# Patient Record
Sex: MALE | Race: BLACK OR AFRICAN AMERICAN | NOT HISPANIC OR LATINO | URBAN - METROPOLITAN AREA
[De-identification: names, ages, dates, MRNs, and addresses within clinical notes are randomized per-mention and may not be internally consistent; named-entity substitution may affect disease eponyms.]

---

## 2018-02-11 ENCOUNTER — INPATIENT (INPATIENT)
Facility: HOSPITAL | Age: 53
LOS: 1 days | Discharge: ANOTHER HOSPITAL | DRG: 309 | End: 2018-02-13
Attending: STUDENT IN AN ORGANIZED HEALTH CARE EDUCATION/TRAINING PROGRAM | Admitting: STUDENT IN AN ORGANIZED HEALTH CARE EDUCATION/TRAINING PROGRAM
Payer: COMMERCIAL

## 2018-02-11 VITALS
RESPIRATION RATE: 18 BRPM | TEMPERATURE: 98 F | WEIGHT: 304.9 LBS | DIASTOLIC BLOOD PRESSURE: 98 MMHG | HEIGHT: 67 IN | HEART RATE: 100 BPM | SYSTOLIC BLOOD PRESSURE: 150 MMHG | OXYGEN SATURATION: 100 %

## 2018-02-11 DIAGNOSIS — Z29.9 ENCOUNTER FOR PROPHYLACTIC MEASURES, UNSPECIFIED: ICD-10-CM

## 2018-02-11 DIAGNOSIS — R74.0 NONSPECIFIC ELEVATION OF LEVELS OF TRANSAMINASE AND LACTIC ACID DEHYDROGENASE [LDH]: ICD-10-CM

## 2018-02-11 DIAGNOSIS — R00.2 PALPITATIONS: ICD-10-CM

## 2018-02-11 DIAGNOSIS — E11.21 TYPE 2 DIABETES MELLITUS WITH DIABETIC NEPHROPATHY: ICD-10-CM

## 2018-02-11 DIAGNOSIS — I10 ESSENTIAL (PRIMARY) HYPERTENSION: ICD-10-CM

## 2018-02-11 DIAGNOSIS — I47.1 SUPRAVENTRICULAR TACHYCARDIA: ICD-10-CM

## 2018-02-11 DIAGNOSIS — N17.9 ACUTE KIDNEY FAILURE, UNSPECIFIED: ICD-10-CM

## 2018-02-11 LAB
ALBUMIN SERPL ELPH-MCNC: 3.2 G/DL — LOW (ref 3.5–5)
ALP SERPL-CCNC: 123 U/L — HIGH (ref 40–120)
ALT FLD-CCNC: 334 U/L DA — HIGH (ref 10–60)
ANION GAP SERPL CALC-SCNC: 10 MMOL/L — SIGNIFICANT CHANGE UP (ref 5–17)
ANION GAP SERPL CALC-SCNC: 8 MMOL/L — SIGNIFICANT CHANGE UP (ref 5–17)
AST SERPL-CCNC: 301 U/L — HIGH (ref 10–40)
BASOPHILS # BLD AUTO: 0.1 K/UL — SIGNIFICANT CHANGE UP (ref 0–0.2)
BASOPHILS NFR BLD AUTO: 1.5 % — SIGNIFICANT CHANGE UP (ref 0–2)
BILIRUB SERPL-MCNC: 0.3 MG/DL — SIGNIFICANT CHANGE UP (ref 0.2–1.2)
BUN SERPL-MCNC: 18 MG/DL — SIGNIFICANT CHANGE UP (ref 7–18)
BUN SERPL-MCNC: 21 MG/DL — HIGH (ref 7–18)
CALCIUM SERPL-MCNC: 8.7 MG/DL — SIGNIFICANT CHANGE UP (ref 8.4–10.5)
CALCIUM SERPL-MCNC: 8.8 MG/DL — SIGNIFICANT CHANGE UP (ref 8.4–10.5)
CHLORIDE SERPL-SCNC: 107 MMOL/L — SIGNIFICANT CHANGE UP (ref 96–108)
CHLORIDE SERPL-SCNC: 107 MMOL/L — SIGNIFICANT CHANGE UP (ref 96–108)
CHLORIDE UR-SCNC: 51 MMOL/L — LOW (ref 55–125)
CK MB BLD-MCNC: <0.9 % — SIGNIFICANT CHANGE UP (ref 0–3.5)
CK MB CFR SERPL CALC: <1 NG/ML — SIGNIFICANT CHANGE UP (ref 0–3.6)
CK SERPL-CCNC: 111 U/L — SIGNIFICANT CHANGE UP (ref 35–232)
CO2 SERPL-SCNC: 23 MMOL/L — SIGNIFICANT CHANGE UP (ref 22–31)
CO2 SERPL-SCNC: 26 MMOL/L — SIGNIFICANT CHANGE UP (ref 22–31)
CREAT SERPL-MCNC: 1.37 MG/DL — HIGH (ref 0.5–1.3)
CREAT SERPL-MCNC: 1.45 MG/DL — HIGH (ref 0.5–1.3)
EOSINOPHIL # BLD AUTO: 0.1 K/UL — SIGNIFICANT CHANGE UP (ref 0–0.5)
EOSINOPHIL NFR BLD AUTO: 2.6 % — SIGNIFICANT CHANGE UP (ref 0–6)
GLUCOSE SERPL-MCNC: 214 MG/DL — HIGH (ref 70–99)
GLUCOSE SERPL-MCNC: 250 MG/DL — HIGH (ref 70–99)
HBA1C BLD-MCNC: 10.4 % — HIGH (ref 4–5.6)
HCT VFR BLD CALC: 44.2 % — SIGNIFICANT CHANGE UP (ref 39–50)
HGB BLD-MCNC: 14.2 G/DL — SIGNIFICANT CHANGE UP (ref 13–17)
LYMPHOCYTES # BLD AUTO: 2 K/UL — SIGNIFICANT CHANGE UP (ref 1–3.3)
LYMPHOCYTES # BLD AUTO: 38.3 % — SIGNIFICANT CHANGE UP (ref 13–44)
MCHC RBC-ENTMCNC: 30.7 PG — SIGNIFICANT CHANGE UP (ref 27–34)
MCHC RBC-ENTMCNC: 32.1 GM/DL — SIGNIFICANT CHANGE UP (ref 32–36)
MCV RBC AUTO: 95.7 FL — SIGNIFICANT CHANGE UP (ref 80–100)
MONOCYTES # BLD AUTO: 0.5 K/UL — SIGNIFICANT CHANGE UP (ref 0–0.9)
MONOCYTES NFR BLD AUTO: 9.1 % — SIGNIFICANT CHANGE UP (ref 2–14)
NEUTROPHILS # BLD AUTO: 2.6 K/UL — SIGNIFICANT CHANGE UP (ref 1.8–7.4)
NEUTROPHILS NFR BLD AUTO: 48.5 % — SIGNIFICANT CHANGE UP (ref 43–77)
OSMOLALITY UR: 853 MOS/KG — SIGNIFICANT CHANGE UP (ref 50–1200)
PCP SPEC-MCNC: SIGNIFICANT CHANGE UP
PLATELET # BLD AUTO: 241 K/UL — SIGNIFICANT CHANGE UP (ref 150–400)
POTASSIUM SERPL-MCNC: 3.7 MMOL/L — SIGNIFICANT CHANGE UP (ref 3.5–5.3)
POTASSIUM SERPL-MCNC: 4 MMOL/L — SIGNIFICANT CHANGE UP (ref 3.5–5.3)
POTASSIUM SERPL-SCNC: 3.7 MMOL/L — SIGNIFICANT CHANGE UP (ref 3.5–5.3)
POTASSIUM SERPL-SCNC: 4 MMOL/L — SIGNIFICANT CHANGE UP (ref 3.5–5.3)
POTASSIUM UR-SCNC: 60 MMOL/L — SIGNIFICANT CHANGE UP (ref 25–125)
PROT ?TM UR-MCNC: 135 MG/DL — HIGH (ref 0–12)
PROT SERPL-MCNC: 6.9 G/DL — SIGNIFICANT CHANGE UP (ref 6–8.3)
RBC # BLD: 4.62 M/UL — SIGNIFICANT CHANGE UP (ref 4.2–5.8)
RBC # FLD: 12.2 % — SIGNIFICANT CHANGE UP (ref 10.3–14.5)
SODIUM SERPL-SCNC: 140 MMOL/L — SIGNIFICANT CHANGE UP (ref 135–145)
SODIUM SERPL-SCNC: 141 MMOL/L — SIGNIFICANT CHANGE UP (ref 135–145)
SODIUM UR-SCNC: 54 MMOL/L — SIGNIFICANT CHANGE UP (ref 40–220)
TROPONIN I SERPL-MCNC: 0.07 NG/ML — HIGH (ref 0–0.04)
TROPONIN I SERPL-MCNC: 0.09 NG/ML — HIGH (ref 0–0.04)
WBC # BLD: 5.3 K/UL — SIGNIFICANT CHANGE UP (ref 3.8–10.5)
WBC # FLD AUTO: 5.3 K/UL — SIGNIFICANT CHANGE UP (ref 3.8–10.5)

## 2018-02-11 PROCEDURE — 99285 EMERGENCY DEPT VISIT HI MDM: CPT | Mod: 25

## 2018-02-11 PROCEDURE — 99223 1ST HOSP IP/OBS HIGH 75: CPT | Mod: GC

## 2018-02-11 RX ORDER — SODIUM CHLORIDE 9 MG/ML
1000 INJECTION, SOLUTION INTRAVENOUS
Qty: 0 | Refills: 0 | Status: DISCONTINUED | OUTPATIENT
Start: 2018-02-11 | End: 2018-02-12

## 2018-02-11 RX ORDER — ASPIRIN/CALCIUM CARB/MAGNESIUM 324 MG
81 TABLET ORAL DAILY
Qty: 0 | Refills: 0 | Status: DISCONTINUED | OUTPATIENT
Start: 2018-02-11 | End: 2018-02-13

## 2018-02-11 RX ORDER — METOPROLOL TARTRATE 50 MG
25 TABLET ORAL
Qty: 0 | Refills: 0 | Status: DISCONTINUED | OUTPATIENT
Start: 2018-02-11 | End: 2018-02-11

## 2018-02-11 RX ORDER — ATORVASTATIN CALCIUM 80 MG/1
40 TABLET, FILM COATED ORAL AT BEDTIME
Qty: 0 | Refills: 0 | Status: DISCONTINUED | OUTPATIENT
Start: 2018-02-11 | End: 2018-02-11

## 2018-02-11 RX ORDER — LABETALOL HCL 100 MG
100 TABLET ORAL
Qty: 0 | Refills: 0 | Status: DISCONTINUED | OUTPATIENT
Start: 2018-02-11 | End: 2018-02-12

## 2018-02-11 RX ORDER — LISINOPRIL 2.5 MG/1
2.5 TABLET ORAL DAILY
Qty: 0 | Refills: 0 | Status: DISCONTINUED | OUTPATIENT
Start: 2018-02-11 | End: 2018-02-11

## 2018-02-11 RX ORDER — INSULIN GLARGINE 100 [IU]/ML
50 INJECTION, SOLUTION SUBCUTANEOUS AT BEDTIME
Qty: 0 | Refills: 0 | Status: DISCONTINUED | OUTPATIENT
Start: 2018-02-11 | End: 2018-02-13

## 2018-02-11 RX ORDER — INSULIN LISPRO 100/ML
20 VIAL (ML) SUBCUTANEOUS
Qty: 0 | Refills: 0 | Status: DISCONTINUED | OUTPATIENT
Start: 2018-02-11 | End: 2018-02-13

## 2018-02-11 RX ORDER — SODIUM CHLORIDE 9 MG/ML
1000 INJECTION INTRAMUSCULAR; INTRAVENOUS; SUBCUTANEOUS ONCE
Qty: 0 | Refills: 0 | Status: COMPLETED | OUTPATIENT
Start: 2018-02-11 | End: 2018-02-11

## 2018-02-11 RX ADMIN — Medication 20 UNIT(S): at 19:49

## 2018-02-11 RX ADMIN — Medication 81 MILLIGRAM(S): at 15:22

## 2018-02-11 RX ADMIN — Medication 20 UNIT(S): at 12:48

## 2018-02-11 RX ADMIN — SODIUM CHLORIDE 1000 MILLILITER(S): 9 INJECTION INTRAMUSCULAR; INTRAVENOUS; SUBCUTANEOUS at 07:45

## 2018-02-11 NOTE — H&P ADULT - HISTORY OF PRESENT ILLNESS
52 M with PMH of DM-2, Htn and Hld presented with palpitations since last 2 days. Patient states that he started to have palpitations, on and off more at night time, associated with shortness of breath. This morning, he started ot have similar symptoms and called EMS, found to be in SVT as per ED physician, s/p adenosine. Patient is late evening shift worker, admitts to drinks 2 cups of caffeine and 2 energy drinks everyday. Denies chest pain, nausea, vomiting, diarrhea, constipation, abdominal pain and pedal edema.   SH: Denies smoking. Drink socially. Former drug use, last use was 20 years ago.     ED Course: EKG: Sinus tachycardia at 103 bpm. At bedside, Patient was comfortably sitting, without any respiratory distress, HR running between 's.

## 2018-02-11 NOTE — H&P ADULT - PROBLEM SELECTOR PLAN 5
Patient found to have transaminitis with normal bilirubin, and benign abdomen.   Could be PELLETIER as patient is obese and not heavy alcoholic.   Follow [facility] abdomen. Follow Hepatitis panel. Trend liver enzymes. Patient found to have transaminitis with normal bilirubin, and benign abdomen.   Could be PELLETIER as patient is obese and not heavy alcoholic.   Bilirubin is normal, biliary obstruction less likely.  Follow [facility] abdomen. Follow Hepatitis panel. Trend liver enzymes. Patient found to have transaminitis with normal bilirubin, and benign abdomen.   Could be PELLETIER as patient is obese and not heavy alcoholic.   Bilirubin is normal, biliary obstruction less likely. Holding Statin.  Follow [facility] abdomen. Follow Hepatitis panel. Trend liver enzymes.

## 2018-02-11 NOTE — H&P ADULT - PROBLEM SELECTOR PLAN 6
IMPROVE VTE score: 0, no indication for DVt ppsx  [ ] Previous VTE                                                3  [ ] Thrombophilia                                             2  [ ] Lower limb paralysis                                  2        (unable to hold up >15 seconds)    [ ] Current Cancer (within 6 months)            2   [] Immobilization > 24 hrs                              1  [ ] ICU/CCU stay > 24 hours                            1  [] Age > 60                                                         1 IMPROVE VTE score: 0, no indication for DVt ppx  [ ] Previous VTE                                                3  [ ] Thrombophilia                                             2  [ ] Lower limb paralysis                                  2        (unable to hold up >15 seconds)    [ ] Current Cancer (within 6 months)            2   [] Immobilization > 24 hrs                              1  [ ] ICU/CCU stay > 24 hours                            1  [] Age > 60                                                         1

## 2018-02-11 NOTE — H&P ADULT - PROBLEM SELECTOR PLAN 4
IMPROVE VTE score: 0, no indication for DVt ppsx  [ ] Previous VTE                                                3  [ ] Thrombophilia                                             2  [ ] Lower limb paralysis                                  2        (unable to hold up >15 seconds)    [ ] Current Cancer (within 6 months)            2   [] Immobilization > 24 hrs                              1  [ ] ICU/CCU stay > 24 hours                            1  [] Age > 60                                                         1 Patient has elevated Cr of 1.37, unknown baseline s/p 1L NS bolus.   Follow BMP in AM  Follow urine lytes Patient has elevated Cr of 1.37, unknown baseline s/p 1L NS bolus.   Follow BMP in AM. Follow urine lytes. Patient has elevated Cr of 1.37, unknown baseline.   Fena 0.3 suggest pre renal. S/p 1L NS bolus.   Continue 1/2 NS since patient is hypertensive. Follow BMP in AM. Patient has elevated Cr of 1.37, unknown baseline.   Fena 0.3 suggest pre renal. S/p 1L NS bolus.   Continue 1/2 NS since patient is hypertensive. Follow BMP in AM.  **Holding ACE given worsening PATY.

## 2018-02-11 NOTE — H&P ADULT - PROBLEM SELECTOR PLAN 1
Patient presented with palpitations found to have SVT in field by EMS s/p adenosine.  Admit to telemetry to rule out ACS.   T1 0.074, follow serial troponin.  EKG Sinus tachycardia at 103bpm, Qtc 482 bpm.  Follow Echo heart and cardiology consult. Patient presented with palpitations found to have SVT in field by EMS s/p adenosine.  Drinking large amount of caffeine and energy drinks could be a cause.   Admit to telemetry to rule out ACS.   T1 0.074, follow serial troponin.  EKG Sinus tachycardia at 103bpm, Qtc 482.  Started metoprolol 25 BID. Started aspirin and atorvastatin.  Follow Echo heart and cardiology consult. Patient presented with palpitations found to have SVT in field by EMS s/p adenosine.  Drinking large amount of caffeine and energy drinks could be a cause.   Admit to telemetry to rule out ACS.   T1 0.074, follow serial troponin.  EKG Sinus tachycardia at 103bpm, Qtc 482.  Started metoprolol 25 BID. Started aspirin. Holding Statin given transaminitis.   Follow Echo heart and cardiology consult.

## 2018-02-11 NOTE — ED ADULT NURSE NOTE - OBJECTIVE STATEMENT
Pt presented to ED via Ambulance, c/o rapid heart beat, as per EMS he was having SVT's , converted with Adenosine,   Pt stated drinks lots of coffee and energy dinks, denies chest pain

## 2018-02-11 NOTE — H&P ADULT - NSHPLABSRESULTS_GEN_ALL_CORE
Vital Signs Last 24 Hrs  T(C): 36.6 (11 Feb 2018 07:05), Max: 36.6 (11 Feb 2018 07:05)  T(F): 97.8 (11 Feb 2018 07:05), Max: 97.8 (11 Feb 2018 07:05)  HR: 100 (11 Feb 2018 07:05) (100 - 100)  BP: 150/98 (11 Feb 2018 07:05) (150/98 - 150/98)  BP(mean): --  RR: 18 (11 Feb 2018 07:05) (18 - 18)  SpO2: 100% (11 Feb 2018 07:05) (100% - 100%)

## 2018-02-11 NOTE — H&P ADULT - NEUROLOGICAL DETAILS
responds to pain/responds to verbal commands/sensation intact/deep reflexes intact/alert and oriented x 3/normal strength

## 2018-02-11 NOTE — CONSULT NOTE ADULT - ASSESSMENT
52 yr old  Male with PMH of DM-2, Htn and Hld presented with palpitations since last 2 days. Patient states that he started to have palpitations, on and off more at night time, associated with shortness of breath., s/p adenosine for SVT.  1.Tele monitoring.  2.CE q8hx3.  3.Echocardiogram.  4.Stress test in AM.  5.HTN-Labetalol 100mg bid and low dose ace.  6.R/O CESAR-sleep study as outpatient.  7.Elevated LFT's-?fatty liver.  8.Check AM A1c,Lipid profile.  9.DM-Insulin.  10.GI and DVT prophylaxis.

## 2018-02-11 NOTE — H&P ADULT - PROBLEM SELECTOR PLAN 2
Patient is on Basglar 76 U at bedtime and Humalog 26 U TID before meals  Will continue Lantus 50U and Humalog 20 U (dose decreased as patient might have less PO intake while inpatient)  Follow HbA1c. Diabetic diet. Exercise 3-5 times a day as tolerated. Patient is on Basaglar 76 U at bedtime and Humalog 26 U TID before meals  Will continue Lantus 50U and Humalog 20 U (dose decreased as patient might have less PO intake while inpatient). Started Atorvastatin.   Follow HbA1c. Diabetic diet. Exercise 3-5 times a day as tolerated. Patient is on Basaglar 76 U at bedtime and Humalog 26 U TID before meals  Will continue Lantus 50U and Humalog 20 U (dose decreased as patient might have less PO intake while inpatient).   Follow HbA1c. Diabetic diet. Exercise 3-5 times a day as tolerated.

## 2018-02-11 NOTE — ED PROVIDER NOTE - OBJECTIVE STATEMENT
morbidly obese  male comes in c/o rapid heartbeat x 4 days worse at night   pt with h/o HTN , DM and hypercholesteremia   + use of energy drinks and coffee  no chest pain , feel like his stomach doesn't let him breath   opt was found by ems to have 's treated with adenocard

## 2018-02-11 NOTE — CONSULT NOTE ADULT - SUBJECTIVE AND OBJECTIVE BOX
CHIEF COMPLAINT:Patient is a 52y old  Male who presents with a chief complaint of Palpitations x 2 days (11 Feb 2018 10:30)    HPI:  52 yr old  Male with PMH of DM-2, Htn and Hld presented with palpitations since last 2 days. Patient states that he started to have palpitations, on and off more at night time, associated with shortness of breath. This morning, he started ot have similar symptoms and called EMS, found to be in SVT as per ED physician, s/p adenosine. 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 Denies chest pain, nausea, vomiting, diarrhea, constipation, abdominal pain and pedal edema.   SED Course: EKG: Sinus tachycardia at 103 bpm. At bedside, Patient was comfortably sitting, without any respiratory distress, HR running between 's. (11 Feb 2018 10:30)      PAST MEDICAL & SURGICAL HISTORY:  DM  HTN  Lipid D/O   Obesity      MEDICATIONS  (STANDING):  insulin glargine Injectable (LANTUS) 50 Unit(s) SubCutaneous at bedtime  insulin lispro Injectable (HumaLOG) 20 Unit(s) SubCutaneous three times a day before meals  metoprolol     tartrate 25 milliGRAM(s) Oral two times a day    MEDICATIONS  (PRN):      FAMILY HISTORY:Father-Gastric Ca,HTN. Mother-HTN, Brother-DM.      SOCIAL HISTORY:    [X ] Non-smoker    [X ] Alcohol-denies    Allergies    Allergy Status Unknown    Intolerances    	    REVIEW OF SYSTEMS:  CONSTITUTIONAL: No fever, weight loss, or fatigue  EYES: No eye pain, visual disturbances, or discharge  ENT:  No difficulty hearing, tinnitus, vertigo; No sinus or throat pain  NECK: No pain or stiffness  RESPIRATORY: No cough, wheezing, chills or hemoptysis; No Shortness of Breath  CARDIOVASCULAR: No chest pain,+ palpitations  GASTROINTESTINAL: No abdominal or epigastric pain. No nausea, vomiting, or hematemesis; No diarrhea or constipation. No melena or hematochezia.  GENITOURINARY: No dysuria, frequency, hematuria, or incontinence  NEUROLOGICAL: No headaches, memory loss, loss of strength, numbness, or tremors  SKIN: No itching, burning, rashes, or lesions   LYMPH Nodes: No enlarged glands  ENDOCRINE: No heat or cold intolerance; No hair loss  MUSCULOSKELETAL: No joint pain or swelling; No muscle, back, or extremity pain  PSYCHIATRIC: No depression, anxiety, mood swings, or difficulty sleeping  HEME/LYMPH: No easy bruising, or bleeding gums  ALLERGY AND IMMUNOLOGIC: No hives or eczema	      PHYSICAL EXAM:  T(C): 36.6 (02-11-18 @ 07:05), Max: 36.6 (02-11-18 @ 07:05)  HR: 100 (02-11-18 @ 07:05) (100 - 100)  BP: 150/98 (02-11-18 @ 07:05) (150/98 - 150/98)  RR: 18 (02-11-18 @ 07:05) (18 - 18)  SpO2: 100% (02-11-18 @ 07:05) (100% - 100%)  Wt(kg): --  I&O's Summary      Appearance: Normal	  HEENT:   Normal oral mucosa, PERRL, EOMI	  Lymphatic: No lymphadenopathy  Cardiovascular: Normal S1 S2, No JVD, No murmurs, No edema  Respiratory: Lungs clear to auscultation	  Psychiatry: A & O x 3, Mood & affect appropriate  Gastrointestinal:  Soft, Non-tender, + BS	  Skin: No rashes, No ecchymoses, No cyanosis	  Neurologic: Non-focal  Extremities: Normal range of motion, No clubbing, cyanosis or edema  Vascular: Peripheral pulses palpable 2+ bilaterally    TELEMETRY: 	sinus tach      	  LABS:	 	    CARDIAC MARKERS:  CARDIAC MARKERS ( 11 Feb 2018 08:07 )  0.074 ng/mL / x     / x     / x     / x                                  14.2   5.3   )-----------( 241      ( 11 Feb 2018 08:07 )             44.2     02-11    140  |  107  |  21<H>  ----------------------------<  250<H>  3.7   |  23  |  1.37<H>    Ca    8.8      11 Feb 2018 08:07    TPro  6.9  /  Alb  3.2<L>  /  TBili  0.3  /  DBili  x   /  AST  301<H>  /  ALT  334<H>  /  AlkPhos  123<H>  02-11    TSH: Thyroid Stimulating Hormone, Serum: 1.35 uU/mL (02-11 @ 11:50)      Urine tox-.

## 2018-02-11 NOTE — H&P ADULT - ASSESSMENT
52 M with PMH of DM-2, Htn and Hld presented with palpitations since last 2 days, found to be in SVT by EMS, s/p adenosine, found ot have elevated troponin on admission; admitted to telemetry.

## 2018-02-11 NOTE — H&P ADULT - PROBLEM SELECTOR PLAN 3
Patient found to have transaminitis with normal bilirubin, and benign abdomen.   Could be PELLETIER as patient is obese and not heavy alcoholic.   Follow [facility] abdomen. Follow Hepatitis panel. Trend liver enzymes. Patient was on multiple medication at home but is not taking from last 5 months.  Started lopressor for now. Titrate up the regimen.  goal bp<130/80 in diabetic patient

## 2018-02-11 NOTE — ED ADULT NURSE NOTE - CHPI ED SYMPTOMS NEG
no chills/no chest pain/no vomiting/no fever/no nausea/no syncope/no back pain/no cough/no shortness of breath

## 2018-02-11 NOTE — PATIENT PROFILE ADULT. - NS TRANSFER PATIENT BELONGINGS
Jewelry/Money (specify)/wireless earphones, Silver looking ring, insulin pens/Cell Phone/PDA (specify)

## 2018-02-11 NOTE — H&P ADULT - ATTENDING COMMENTS
Agree with above.   Patient was seen and examined in ED. HE is a 51 yo morbidly obese male with PMHs of HTN, DM presented with two days of palpitations. FOund to be in SVT which broke after 6 mg of adenosine. He denies chest pain, dizziness, headache, n/v, fever. Reports recent worsening of SOB on exertion.  Nor on any BP medications for the past 5 month     ROS as above   PE:   General: MOrbidly obese, NAD   NEck: supple, no JVD   CArdio: regular rate and rhythm, no murmur   Pulm: clear breath sounds   GI/: obese abdomen, non tender   Extr: no edema, no rash   Neuro: AOx3, no focal weakness     Vital Signs Last 24 Hrs  T(C): 36.5 (11 Feb 2018 16:01), Max: 36.6 (11 Feb 2018 07:05)  T(F): 97.7 (11 Feb 2018 16:01), Max: 97.8 (11 Feb 2018 07:05)  HR: 95 (11 Feb 2018 16:01) (95 - 100)  BP: 166/93 (11 Feb 2018 16:01) (150/98 - 166/93)  BP(mean): --  RR: 18 (11 Feb 2018 16:01) (18 - 18)  SpO2: 96% (11 Feb 2018 16:01) (96% - 100%)    1. SVT -broke s/p adenosine   EKG: NSR   Telemetry monitoring   Trend CEx3  Per cardiology consult started Labetalol 100 mg  PO twice daily along with Aspirin and Atorvastatin 40 mg PO daily   TTE and stress test in AM      2. DM: f/u HbA1C  Restart home doses of Lantus 50  and Humalog 20 three times a day     3. HTN: BP borderline elevated: on Labetolol       the rest as above

## 2018-02-11 NOTE — ED ADULT TRIAGE NOTE - CHIEF COMPLAINT QUOTE
c/o fast heart beat and sob,per ems patient having svt,adenosine 6 mg iv given and converted to sinus,patient denies chest pain

## 2018-02-12 DIAGNOSIS — I42.9 CARDIOMYOPATHY, UNSPECIFIED: ICD-10-CM

## 2018-02-12 DIAGNOSIS — I49.9 CARDIAC ARRHYTHMIA, UNSPECIFIED: ICD-10-CM

## 2018-02-12 DIAGNOSIS — E66.01 MORBID (SEVERE) OBESITY DUE TO EXCESS CALORIES: ICD-10-CM

## 2018-02-12 LAB
ALBUMIN SERPL ELPH-MCNC: 3.7 G/DL — SIGNIFICANT CHANGE UP (ref 3.5–5)
ALP SERPL-CCNC: 94 U/L — SIGNIFICANT CHANGE UP (ref 40–120)
ALT FLD-CCNC: 321 U/L DA — HIGH (ref 10–60)
ANION GAP SERPL CALC-SCNC: 5 MMOL/L — SIGNIFICANT CHANGE UP (ref 5–17)
AST SERPL-CCNC: 163 U/L — HIGH (ref 10–40)
BASOPHILS # BLD AUTO: 0.1 K/UL — SIGNIFICANT CHANGE UP (ref 0–0.2)
BASOPHILS NFR BLD AUTO: 1.4 % — SIGNIFICANT CHANGE UP (ref 0–2)
BILIRUB SERPL-MCNC: 0.5 MG/DL — SIGNIFICANT CHANGE UP (ref 0.2–1.2)
BUN SERPL-MCNC: 18 MG/DL — SIGNIFICANT CHANGE UP (ref 7–18)
CALCIUM SERPL-MCNC: 9.1 MG/DL — SIGNIFICANT CHANGE UP (ref 8.4–10.5)
CHLORIDE SERPL-SCNC: 108 MMOL/L — SIGNIFICANT CHANGE UP (ref 96–108)
CK MB BLD-MCNC: <1 % — SIGNIFICANT CHANGE UP (ref 0–3.5)
CK MB BLD-MCNC: <1 % — SIGNIFICANT CHANGE UP (ref 0–3.5)
CK MB CFR SERPL CALC: <1 NG/ML — SIGNIFICANT CHANGE UP (ref 0–3.6)
CK MB CFR SERPL CALC: <1 NG/ML — SIGNIFICANT CHANGE UP (ref 0–3.6)
CK SERPL-CCNC: 103 U/L — SIGNIFICANT CHANGE UP (ref 35–232)
CK SERPL-CCNC: 97 U/L — SIGNIFICANT CHANGE UP (ref 35–232)
CO2 SERPL-SCNC: 29 MMOL/L — SIGNIFICANT CHANGE UP (ref 22–31)
CREAT SERPL-MCNC: 1.29 MG/DL — SIGNIFICANT CHANGE UP (ref 0.5–1.3)
EOSINOPHIL # BLD AUTO: 0.3 K/UL — SIGNIFICANT CHANGE UP (ref 0–0.5)
EOSINOPHIL NFR BLD AUTO: 4.9 % — SIGNIFICANT CHANGE UP (ref 0–6)
GLUCOSE SERPL-MCNC: 141 MG/DL — HIGH (ref 70–99)
HAV IGM SER-ACNC: SIGNIFICANT CHANGE UP
HBV CORE IGM SER-ACNC: SIGNIFICANT CHANGE UP
HBV SURFACE AG SER-ACNC: SIGNIFICANT CHANGE UP
HCT VFR BLD CALC: 43.3 % — SIGNIFICANT CHANGE UP (ref 39–50)
HCV AB S/CO SERPL IA: 0.16 S/CO — SIGNIFICANT CHANGE UP
HCV AB SERPL-IMP: SIGNIFICANT CHANGE UP
HGB BLD-MCNC: 13.7 G/DL — SIGNIFICANT CHANGE UP (ref 13–17)
HIV 1+2 AB+HIV1 P24 AG SERPL QL IA: SIGNIFICANT CHANGE UP
LYMPHOCYTES # BLD AUTO: 2.4 K/UL — SIGNIFICANT CHANGE UP (ref 1–3.3)
LYMPHOCYTES # BLD AUTO: 35.3 % — SIGNIFICANT CHANGE UP (ref 13–44)
MAGNESIUM SERPL-MCNC: 2.1 MG/DL — SIGNIFICANT CHANGE UP (ref 1.6–2.6)
MCHC RBC-ENTMCNC: 30.7 PG — SIGNIFICANT CHANGE UP (ref 27–34)
MCHC RBC-ENTMCNC: 31.7 GM/DL — LOW (ref 32–36)
MCV RBC AUTO: 96.9 FL — SIGNIFICANT CHANGE UP (ref 80–100)
MONOCYTES # BLD AUTO: 0.4 K/UL — SIGNIFICANT CHANGE UP (ref 0–0.9)
MONOCYTES NFR BLD AUTO: 6.1 % — SIGNIFICANT CHANGE UP (ref 2–14)
NEUTROPHILS # BLD AUTO: 3.5 K/UL — SIGNIFICANT CHANGE UP (ref 1.8–7.4)
NEUTROPHILS NFR BLD AUTO: 52.3 % — SIGNIFICANT CHANGE UP (ref 43–77)
PHOSPHATE SERPL-MCNC: 4.4 MG/DL — SIGNIFICANT CHANGE UP (ref 2.5–4.5)
PLATELET # BLD AUTO: 228 K/UL — SIGNIFICANT CHANGE UP (ref 150–400)
POTASSIUM SERPL-MCNC: 4.5 MMOL/L — SIGNIFICANT CHANGE UP (ref 3.5–5.3)
POTASSIUM SERPL-SCNC: 4.5 MMOL/L — SIGNIFICANT CHANGE UP (ref 3.5–5.3)
PROT SERPL-MCNC: 7.2 G/DL — SIGNIFICANT CHANGE UP (ref 6–8.3)
RBC # BLD: 4.47 M/UL — SIGNIFICANT CHANGE UP (ref 4.2–5.8)
RBC # FLD: 12.5 % — SIGNIFICANT CHANGE UP (ref 10.3–14.5)
SODIUM SERPL-SCNC: 142 MMOL/L — SIGNIFICANT CHANGE UP (ref 135–145)
TROPONIN I SERPL-MCNC: 0.05 NG/ML — HIGH (ref 0–0.04)
TROPONIN I SERPL-MCNC: 0.07 NG/ML — HIGH (ref 0–0.04)
TSH SERPL-MCNC: 0.95 UU/ML — SIGNIFICANT CHANGE UP (ref 0.34–4.82)
WBC # BLD: 6.7 K/UL — SIGNIFICANT CHANGE UP (ref 3.8–10.5)
WBC # FLD AUTO: 6.7 K/UL — SIGNIFICANT CHANGE UP (ref 3.8–10.5)

## 2018-02-12 PROCEDURE — 99233 SBSQ HOSP IP/OBS HIGH 50: CPT | Mod: GC

## 2018-02-12 RX ORDER — LISINOPRIL 2.5 MG/1
5 TABLET ORAL DAILY
Qty: 0 | Refills: 0 | Status: DISCONTINUED | OUTPATIENT
Start: 2018-02-12 | End: 2018-02-12

## 2018-02-12 RX ORDER — LABETALOL HCL 100 MG
100 TABLET ORAL THREE TIMES A DAY
Qty: 0 | Refills: 0 | Status: DISCONTINUED | OUTPATIENT
Start: 2018-02-12 | End: 2018-02-13

## 2018-02-12 RX ORDER — ATORVASTATIN CALCIUM 80 MG/1
40 TABLET, FILM COATED ORAL AT BEDTIME
Qty: 0 | Refills: 0 | Status: DISCONTINUED | OUTPATIENT
Start: 2018-02-12 | End: 2018-02-13

## 2018-02-12 RX ORDER — LISINOPRIL 2.5 MG/1
10 TABLET ORAL DAILY
Qty: 0 | Refills: 0 | Status: DISCONTINUED | OUTPATIENT
Start: 2018-02-12 | End: 2018-02-13

## 2018-02-12 RX ORDER — ACETYLCYSTEINE 200 MG/ML
600 VIAL (ML) MISCELLANEOUS
Qty: 0 | Refills: 0 | Status: DISCONTINUED | OUTPATIENT
Start: 2018-02-12 | End: 2018-02-13

## 2018-02-12 RX ADMIN — INSULIN GLARGINE 50 UNIT(S): 100 INJECTION, SOLUTION SUBCUTANEOUS at 21:26

## 2018-02-12 RX ADMIN — Medication 600 MILLIGRAM(S): at 17:31

## 2018-02-12 RX ADMIN — Medication 100 MILLIGRAM(S): at 14:46

## 2018-02-12 RX ADMIN — INSULIN GLARGINE 50 UNIT(S): 100 INJECTION, SOLUTION SUBCUTANEOUS at 01:09

## 2018-02-12 RX ADMIN — Medication 100 MILLIGRAM(S): at 21:26

## 2018-02-12 RX ADMIN — Medication 100 MILLIGRAM(S): at 06:23

## 2018-02-12 RX ADMIN — LISINOPRIL 5 MILLIGRAM(S): 2.5 TABLET ORAL at 12:45

## 2018-02-12 RX ADMIN — Medication 100 MILLIGRAM(S): at 01:09

## 2018-02-12 RX ADMIN — ATORVASTATIN CALCIUM 40 MILLIGRAM(S): 80 TABLET, FILM COATED ORAL at 21:26

## 2018-02-12 RX ADMIN — Medication 81 MILLIGRAM(S): at 12:45

## 2018-02-12 RX ADMIN — Medication 20 UNIT(S): at 12:45

## 2018-02-12 NOTE — PROGRESS NOTE ADULT - PROBLEM SELECTOR PLAN 3
Stress test done; EF 30 %   Patient would need a cath to rule out ischemic cause   Plan for possible transfer in AM   On Lisinopril and Labetalol

## 2018-02-12 NOTE — DISCHARGE NOTE ADULT - PATIENT PORTAL LINK FT
You can access the [facility], offered by [facility], by registering with the following website: [email or web address]

## 2018-02-12 NOTE — DISCHARGE NOTE ADULT - HOSPITAL COURSE
52 M with PMH of DM-2, Htn and Hld presented with palpitations since last 2 days, found to be in SVT by EMS, s/p adenosine, found ot have elevated troponin on admission; admitted to telemetry. Pt had mildly elevated troponins to max 0.08, all 3 mildly elevated. EKG showed sinus tachycardiac at 103. Pt started on metoprolol 25 BID, started on asprin, holding statin given transaminitis.   Stress test showed reduced EF of 34%, no reversible ischemia. Per cardiology, patient should undergo cardiac catheterization given suspicion for acute ischemia.   Pt on Basaglar 76 U at bedtime and humalog 26 U TID before meals, however continued lantus 50 U and humalog 20 U TID due to less PO intake while in-house. HbA1c is 10.4%             Problem/Plan - 4:  ·  Problem: PATY (acute kidney injury).  Plan: Patient has elevated Cr of 1.37, unknown baseline.   Fena 0.3 suggest pre renal. S/p 1L NS bolus.   Continue 1/2 NS since patient is hypertensive. Follow BMP in AM.  **Holding ACE given worsening PATY.      Problem/Plan - 5:  ·  Problem: Transaminitis.  Plan: Patient found to have transaminitis with normal bilirubin, and benign abdomen.   Could be PELLETIER as patient is obese and not heavy alcoholic.   Bilirubin is normal, biliary obstruction less likely. Holding Statin.  Follow [facility] abdomen. Follow Hepatitis panel. Trend liver enzymes. 52 M with PMH of DM-2, Htn and Hld presented with palpitations since last 2 days, found to be in SVT by EMS, s/p adenosine, found ot have elevated troponin on admission; admitted to telemetry. Pt had mildly elevated troponins to max 0.08, all 3 mildly elevated. EKG showed sinus tachycardiac at 103. Pt started on metoprolol 25 BID, started on asprin, holding statin given transaminitis.   Stress test showed reduced EF of 34%, no reversible ischemia. Per cardiology, patient should undergo cardiac catheterization given suspicion for acute ischemia.   Pt on Basaglar 76 U at bedtime and humalog 26 U TID before meals, however continued lantus 50 U and humalog 20 U TID due to less PO intake while in-house. HbA1c is 10.4%.  Pt had elevated creatinine of 1.37 with unknown baseline.   ACE inhibitor held given worsening PATY and 1L bolus given with maintenance at 1/2 NS. Creatinine has improved to 1.25.  Pt found to have transaminitis with normal bilirubin, benign abdomen. Statin was therefore held. Could be PELLETIER as pt is obese and not a heavy alcoholic per history. Hepatitis panel was negative. Pt had US of abdomen which showed...    Pt is stable for transfer to [place] for cardiac catheterization due to high suspicion of ischemic cardiomyopathy. Discussed above with attending Dr. Ochoa and cardiologist Dr. Rivas.

## 2018-02-12 NOTE — DISCHARGE NOTE ADULT - CARE PLAN
Principal Discharge DX:	Cardiomyopathy  Secondary Diagnosis:	Hypertension  Secondary Diagnosis:	PATY (acute kidney injury)  Secondary Diagnosis:	Transaminitis  Secondary Diagnosis:	Type 2 diabetes mellitus with diabetic nephropathy, with long-term current use of insulin Principal Discharge DX:	Cardiomyopathy  Goal:	cardiac catheterization to determine ischemic origin  Assessment and plan of treatment:	Your stress test was found to be abnormal, with reduced EF of 34%, however no ischemia or reversible ischemia was noted on the stress test. To be absolutely sure of no ischemic causes of your cardiomyopathy, it is advised for you to undergo cardiac catheterization; therefore you will be transferred to [place].  Secondary Diagnosis:	Hypertension  Goal:	BP < 140/90  Assessment and plan of treatment:	Continue with lisinopril and labetalol as above.  Secondary Diagnosis:	PATY (acute kidney injury)  Secondary Diagnosis:	Transaminitis  Assessment and plan of treatment:	Your liver enzymes were initially very elevated, however are now trending down. You were checked for hepatitis, results of which are normal. Please follow-up with an abdominal ultrasound to further investigate this finding.  Secondary Diagnosis:	Type 2 diabetes mellitus with diabetic nephropathy, with long-term current use of insulin  Goal:	HbA1c <7.0%  Assessment and plan of treatment:	Continue with insulin regimen as above. Your HbA1c is 10.4%. Please recheck your HbA1c in 3-4 months. Principal Discharge DX:	Cardiomyopathy  Goal:	cardiac catheterization to determine ischemic origin  Assessment and plan of treatment:	Your stress test was found to be abnormal, with reduced EF of 34%, however no ischemia or reversible ischemia was noted on the stress test. To be absolutely sure of no ischemic causes of your cardiomyopathy, it is advised for you to undergo cardiac catheterization; therefore you will be transferred to [place].  Secondary Diagnosis:	Hypertension  Goal:	BP < 140/90  Assessment and plan of treatment:	Continue with lisinopril and labetalol as above.  Secondary Diagnosis:	PATY (acute kidney injury)  Assessment and plan of treatment:	Your creatinine was found to be elevated, signifying acute kidney injury. With IV fluids and acetylcysteine, your creatinine improved to 1.29. As your baseline is unknown, your creatinine may continue to improve or it may stay at 1.29 signifying possible chronic kidney disease, likely secondary to diabetes mellitus. Please follow-up with your primary care doctor regarding your creatinine levels. Lots of oral hydration is greatly encouraged.  Secondary Diagnosis:	Transaminitis  Assessment and plan of treatment:	Your liver enzymes were initially very elevated, however are now trending down. You were checked for hepatitis, results of which are normal. Please follow-up with an abdominal ultrasound to further investigate this finding.  Secondary Diagnosis:	Type 2 diabetes mellitus with diabetic nephropathy, with long-term current use of insulin  Goal:	HbA1c <7.0%  Assessment and plan of treatment:	Continue with insulin regimen as above. Your HbA1c is 10.4%. Please recheck your HbA1c in 3-4 months.

## 2018-02-12 NOTE — PROGRESS NOTE ADULT - ASSESSMENT
52 yr old  Male with PMH of DM-2, Htn and Hld presented with palpitations since last 2 days. Patient states that he started to have palpitations, on and off more at night time, associated with shortness of breath., s/p adenosine for SVT.  1.Tele monitoring.  2.CE q8hx3.  3.Echocardiogram.  4.Stress test today  5.HTN- inc Labetalol 100mg tid and low dose ace.  6.R/O CESAR-sleep study as outpatient.  7.Elevated LFT's-?fatty liver, abd sono ordered.  8.Check Lipid profile.  9.DM-Insulin.  10.GI and DVT prophylaxis.

## 2018-02-12 NOTE — DISCHARGE NOTE ADULT - MEDICATION SUMMARY - MEDICATIONS TO CHANGE
I will SWITCH the dose or number of times a day I take the medications listed below when I get home from the hospital:    Basaglar KwikPen 100 units/mL subcutaneous solution  -- 76 unit(s) subcutaneous once a day (at bedtime)    HumaLOG 100 units/mL injectable solution  -- 26 unit(s) injectable 3 times a day (before meals)

## 2018-02-12 NOTE — PROGRESS NOTE ADULT - SUBJECTIVE AND OBJECTIVE BOX
Patient is a 52y old  Male who presents with a chief complaint of Palpitations x 2 days (12 Feb 2018 15:11)      INTERVAL HPI/OVERNIGHT EVENTS: patient is seen and examine at bedside. No overnight event. Had stress test done earlier.     MEDICATIONS  (STANDING):  acetylcysteine  Oral Solution 600 milliGRAM(s) Oral two times a day  aspirin  chewable 81 milliGRAM(s) Oral daily  insulin glargine Injectable (LANTUS) 50 Unit(s) SubCutaneous at bedtime  insulin lispro Injectable (HumaLOG) 20 Unit(s) SubCutaneous three times a day before meals  labetalol 100 milliGRAM(s) Oral three times a day  lisinopril 5 milliGRAM(s) Oral daily    MEDICATIONS  (PRN):      Allergies    Allergy Status Unknown    Intolerances        REVIEW OF SYSTEMS:  CONSTITUTIONAL: No fever, weight loss, or fatigue  RESPIRATORY: No cough, wheezing, chills or hemoptysis; No shortness of breath  CARDIOVASCULAR: No chest pain, palpitations, dizziness, or leg swelling  GASTROINTESTINAL: No abdominal or epigastric pain. No nausea, vomiting, or hematemesis; No diarrhea or constipation. No melena or hematochezia.  NEUROLOGICAL: No headaches, memory loss, loss of strength, numbness, or tremors  MUSCULOSKELETAL: No joint pain or swelling; No muscle, back, or extremity pain      Vital Signs Last 24 Hrs  T(C): 36.7 (12 Feb 2018 11:38), Max: 37.3 (12 Feb 2018 07:23)  T(F): 98.1 (12 Feb 2018 11:38), Max: 99.2 (12 Feb 2018 07:23)  HR: 96 (12 Feb 2018 11:38) (94 - 96)  BP: 169/96 (12 Feb 2018 11:38) (151/91 - 171/104)  BP(mean): --  RR: 19 (12 Feb 2018 11:38) (18 - 19)  SpO2: 95% (12 Feb 2018 11:38) (94% - 98%)    PHYSICAL EXAM:  GENERAL: NAD, Morbidly obese   HEAD:  Atraumatic, Normocephalic  EYES:  conjunctiva and sclera clear  NECK: Supple, No JVD, Normal thyroid  NERVOUS SYSTEM:  Alert & Oriented X3, No gross focal deficits  CHEST/LUNG: Clear to percussion bilaterally; No rales, rhonchi, wheezing, or rubs  HEART: Regular rate and rhythm; No murmurs, rubs, or gallops  ABDOMEN: Obese abdomen, Soft, Nontender, Nondistended; Bowel sounds present  EXTREMITIES:  No clubbing, cyanosis, or edema  SKIN: No rashes or lesions    LABS:                        13.7   6.7   )-----------( 228      ( 12 Feb 2018 07:43 )             43.3     02-12    142  |  108  |  18  ----------------------------<  141<H>  4.5   |  29  |  1.29    Ca    9.1      12 Feb 2018 07:43  Phos  4.4     02-12  Mg     2.1     02-12    TPro  7.2  /  Alb  3.7  /  TBili  0.5  /  DBili  0.1  /  AST  163<H>  /  ALT  321<H>  /  AlkPhos  94  02-12        CAPILLARY BLOOD GLUCOSE      POCT Blood Glucose.: 183 mg/dL (12 Feb 2018 12:06)  POCT Blood Glucose.: 116 mg/dL (12 Feb 2018 00:54)  POCT Blood Glucose.: 188 mg/dL (11 Feb 2018 19:26)      RADIOLOGY & ADDITIONAL TESTS:    Imaging Personally Reviewed:  [ ] YES  [ ] NO    Consultant(s) Notes Reviewed:  [ x] YES  [ ] NO    Care Discussed with Consultants/Other Providers [ ] YES  [ ] NO    Plan of Care discussed with Housestaff [ ]YES [ ] NO

## 2018-02-12 NOTE — PROGRESS NOTE ADULT - PROBLEM SELECTOR PLAN 6
Patient found to have transaminitis with normal bilirubin, and benign abdomen.   Could be PELLETIER as patient is obese and not heavy alcoholic.   Bilirubin is normal, biliary obstruction less likely.   Pending US abdomen

## 2018-02-12 NOTE — DISCHARGE NOTE ADULT - MEDICATION SUMMARY - MEDICATIONS TO TAKE
I will START or STAY ON the medications listed below when I get home from the hospital:    aspirin 81 mg oral tablet, chewable  -- 1 tab(s) by mouth once a day  -- Indication: For Prophylactic measure    lisinopril 10 mg oral tablet  -- 1 tab(s) by mouth once a day  -- Indication: For Hypertension    insulin glargine 100 units/mL subcutaneous solution  -- 50 unit(s) subcutaneous once a day (at bedtime) x 30 days   -- Do not drink alcoholic beverages when taking this medication.  It is very important that you take or use this exactly as directed.  Do not skip doses or discontinue unless directed by your doctor.  Keep in refrigerator.  Do not freeze.    -- Indication: For Diabetes mellitus    HumaLOG KwikPen (Concentrated) 200 units/mL subcutaneous solution  -- 20 unit(s) subcutaneous 3 times a day   -- Indication: For Diabetes mellitus    acetylcysteine 600 mg oral capsule  -- 1 cap(s) by mouth 2 times a day   -- Take with food or milk.    -- Indication: For PATY (acute kidney injury)    atorvastatin 40 mg oral tablet  -- 1 tab(s) by mouth once a day (at bedtime)  -- Indication: For Hyperlipidemia    labetalol 100 mg oral tablet  -- 1 tab(s) by mouth 3 times a day  -- Indication: For Hypertension

## 2018-02-12 NOTE — DISCHARGE NOTE ADULT - PLAN OF CARE
cardiac catheterization to determine ischemic origin Your stress test was found to be abnormal, with reduced EF of 34%, however no ischemia or reversible ischemia was noted on the stress test. To be absolutely sure of no ischemic causes of your cardiomyopathy, it is advised for you to undergo cardiac catheterization; therefore you will be transferred to [place]. BP < 140/90 Continue with lisinopril and labetalol as above. Your liver enzymes were initially very elevated, however are now trending down. You were checked for hepatitis, results of which are normal. Please follow-up with an abdominal ultrasound to further investigate this finding. HbA1c <7.0% Continue with insulin regimen as above. Your HbA1c is 10.4%. Please recheck your HbA1c in 3-4 months. Your creatinine was found to be elevated, signifying acute kidney injury. With IV fluids and acetylcysteine, your creatinine improved to 1.29. As your baseline is unknown, your creatinine may continue to improve or it may stay at 1.29 signifying possible chronic kidney disease, likely secondary to diabetes mellitus. Please follow-up with your primary care doctor regarding your creatinine levels. Lots of oral hydration is greatly encouraged.

## 2018-02-12 NOTE — DISCHARGE NOTE ADULT - SECONDARY DIAGNOSIS.
Hypertension PATY (acute kidney injury) Transaminitis Type 2 diabetes mellitus with diabetic nephropathy, with long-term current use of insulin

## 2018-02-13 ENCOUNTER — INPATIENT (INPATIENT)
Facility: HOSPITAL | Age: 53
LOS: 0 days | Discharge: HOME | DRG: 287 | End: 2018-02-13
Attending: INTERNAL MEDICINE | Admitting: INTERNAL MEDICINE
Payer: COMMERCIAL

## 2018-02-13 VITALS
SYSTOLIC BLOOD PRESSURE: 145 MMHG | TEMPERATURE: 98 F | HEIGHT: 67 IN | HEART RATE: 85 BPM | OXYGEN SATURATION: 96 % | RESPIRATION RATE: 18 BRPM | WEIGHT: 304.02 LBS | DIASTOLIC BLOOD PRESSURE: 93 MMHG

## 2018-02-13 VITALS
TEMPERATURE: 99 F | DIASTOLIC BLOOD PRESSURE: 84 MMHG | OXYGEN SATURATION: 94 % | RESPIRATION RATE: 17 BRPM | SYSTOLIC BLOOD PRESSURE: 145 MMHG | HEART RATE: 85 BPM

## 2018-02-13 VITALS — WEIGHT: 303.8 LBS

## 2018-02-13 DIAGNOSIS — I42.9 CARDIOMYOPATHY, UNSPECIFIED: ICD-10-CM

## 2018-02-13 LAB
ALBUMIN SERPL ELPH-MCNC: 3.4 G/DL — LOW (ref 3.5–5)
ALP SERPL-CCNC: 85 U/L — SIGNIFICANT CHANGE UP (ref 40–120)
ALT FLD-CCNC: 205 U/L DA — HIGH (ref 10–60)
ANION GAP SERPL CALC-SCNC: 7 MMOL/L — SIGNIFICANT CHANGE UP (ref 5–17)
AST SERPL-CCNC: 46 U/L — HIGH (ref 10–40)
BASOPHILS # BLD AUTO: 0.1 K/UL — SIGNIFICANT CHANGE UP (ref 0–0.2)
BASOPHILS NFR BLD AUTO: 1 % — SIGNIFICANT CHANGE UP (ref 0–2)
BILIRUB SERPL-MCNC: 0.5 MG/DL — SIGNIFICANT CHANGE UP (ref 0.2–1.2)
BUN SERPL-MCNC: 15 MG/DL — SIGNIFICANT CHANGE UP (ref 7–18)
CALCIUM SERPL-MCNC: 8.8 MG/DL — SIGNIFICANT CHANGE UP (ref 8.4–10.5)
CHLORIDE SERPL-SCNC: 104 MMOL/L — SIGNIFICANT CHANGE UP (ref 96–108)
CO2 SERPL-SCNC: 29 MMOL/L — SIGNIFICANT CHANGE UP (ref 22–31)
CREAT SERPL-MCNC: 1.29 MG/DL — SIGNIFICANT CHANGE UP (ref 0.5–1.3)
EOSINOPHIL # BLD AUTO: 0.3 K/UL — SIGNIFICANT CHANGE UP (ref 0–0.5)
EOSINOPHIL NFR BLD AUTO: 4.4 % — SIGNIFICANT CHANGE UP (ref 0–6)
GLUCOSE BLDC GLUCOMTR-MCNC: 140 MG/DL — HIGH (ref 70–99)
GLUCOSE BLDC GLUCOMTR-MCNC: 141 MG/DL — HIGH (ref 70–99)
GLUCOSE SERPL-MCNC: 196 MG/DL — HIGH (ref 70–99)
HCT VFR BLD CALC: 45.2 % — SIGNIFICANT CHANGE UP (ref 39–50)
HGB BLD-MCNC: 13.8 G/DL — SIGNIFICANT CHANGE UP (ref 13–17)
LYMPHOCYTES # BLD AUTO: 2.7 K/UL — SIGNIFICANT CHANGE UP (ref 1–3.3)
LYMPHOCYTES # BLD AUTO: 37.2 % — SIGNIFICANT CHANGE UP (ref 13–44)
MAGNESIUM SERPL-MCNC: 2 MG/DL — SIGNIFICANT CHANGE UP (ref 1.6–2.6)
MCHC RBC-ENTMCNC: 29.2 PG — SIGNIFICANT CHANGE UP (ref 27–34)
MCHC RBC-ENTMCNC: 30.6 GM/DL — LOW (ref 32–36)
MCV RBC AUTO: 95.7 FL — SIGNIFICANT CHANGE UP (ref 80–100)
MONOCYTES # BLD AUTO: 0.5 K/UL — SIGNIFICANT CHANGE UP (ref 0–0.9)
MONOCYTES NFR BLD AUTO: 7.5 % — SIGNIFICANT CHANGE UP (ref 2–14)
NEUTROPHILS # BLD AUTO: 3.6 K/UL — SIGNIFICANT CHANGE UP (ref 1.8–7.4)
NEUTROPHILS NFR BLD AUTO: 49.8 % — SIGNIFICANT CHANGE UP (ref 43–77)
PHOSPHATE SERPL-MCNC: 3.5 MG/DL — SIGNIFICANT CHANGE UP (ref 2.5–4.5)
PLATELET # BLD AUTO: 232 K/UL — SIGNIFICANT CHANGE UP (ref 150–400)
POTASSIUM SERPL-MCNC: 4.2 MMOL/L — SIGNIFICANT CHANGE UP (ref 3.5–5.3)
POTASSIUM SERPL-SCNC: 4.2 MMOL/L — SIGNIFICANT CHANGE UP (ref 3.5–5.3)
PROT SERPL-MCNC: 7 G/DL — SIGNIFICANT CHANGE UP (ref 6–8.3)
RBC # BLD: 4.72 M/UL — SIGNIFICANT CHANGE UP (ref 4.2–5.8)
RBC # FLD: 12.4 % — SIGNIFICANT CHANGE UP (ref 10.3–14.5)
SODIUM SERPL-SCNC: 140 MMOL/L — SIGNIFICANT CHANGE UP (ref 135–145)
WBC # BLD: 7.2 K/UL — SIGNIFICANT CHANGE UP (ref 3.8–10.5)
WBC # FLD AUTO: 7.2 K/UL — SIGNIFICANT CHANGE UP (ref 3.8–10.5)

## 2018-02-13 PROCEDURE — 80074 ACUTE HEPATITIS PANEL: CPT

## 2018-02-13 PROCEDURE — C1769: CPT

## 2018-02-13 PROCEDURE — 80053 COMPREHEN METABOLIC PANEL: CPT

## 2018-02-13 PROCEDURE — 99152 MOD SED SAME PHYS/QHP 5/>YRS: CPT

## 2018-02-13 PROCEDURE — 85027 COMPLETE CBC AUTOMATED: CPT

## 2018-02-13 PROCEDURE — 82248 BILIRUBIN DIRECT: CPT

## 2018-02-13 PROCEDURE — 87389 HIV-1 AG W/HIV-1&-2 AB AG IA: CPT

## 2018-02-13 PROCEDURE — 83735 ASSAY OF MAGNESIUM: CPT

## 2018-02-13 PROCEDURE — 82553 CREATINE MB FRACTION: CPT

## 2018-02-13 PROCEDURE — C1894: CPT

## 2018-02-13 PROCEDURE — 84133 ASSAY OF URINE POTASSIUM: CPT

## 2018-02-13 PROCEDURE — 93017 CV STRESS TEST TRACING ONLY: CPT

## 2018-02-13 PROCEDURE — C1887: CPT

## 2018-02-13 PROCEDURE — 84443 ASSAY THYROID STIM HORMONE: CPT

## 2018-02-13 PROCEDURE — 80061 LIPID PANEL: CPT

## 2018-02-13 PROCEDURE — 93306 TTE W/DOPPLER COMPLETE: CPT

## 2018-02-13 PROCEDURE — 93460 R&L HRT ART/VENTRICLE ANGIO: CPT

## 2018-02-13 PROCEDURE — 82962 GLUCOSE BLOOD TEST: CPT

## 2018-02-13 PROCEDURE — A9502: CPT

## 2018-02-13 PROCEDURE — 84100 ASSAY OF PHOSPHORUS: CPT

## 2018-02-13 PROCEDURE — 80307 DRUG TEST PRSMV CHEM ANLYZR: CPT

## 2018-02-13 PROCEDURE — 93005 ELECTROCARDIOGRAM TRACING: CPT

## 2018-02-13 PROCEDURE — 83036 HEMOGLOBIN GLYCOSYLATED A1C: CPT

## 2018-02-13 PROCEDURE — 78452 HT MUSCLE IMAGE SPECT MULT: CPT

## 2018-02-13 PROCEDURE — 84300 ASSAY OF URINE SODIUM: CPT

## 2018-02-13 PROCEDURE — 82550 ASSAY OF CK (CPK): CPT

## 2018-02-13 PROCEDURE — 83935 ASSAY OF URINE OSMOLALITY: CPT

## 2018-02-13 PROCEDURE — 84484 ASSAY OF TROPONIN QUANT: CPT

## 2018-02-13 PROCEDURE — 99153 MOD SED SAME PHYS/QHP EA: CPT

## 2018-02-13 PROCEDURE — 93010 ELECTROCARDIOGRAM REPORT: CPT

## 2018-02-13 PROCEDURE — 82436 ASSAY OF URINE CHLORIDE: CPT

## 2018-02-13 PROCEDURE — 82570 ASSAY OF URINE CREATININE: CPT

## 2018-02-13 PROCEDURE — 99285 EMERGENCY DEPT VISIT HI MDM: CPT | Mod: 25

## 2018-02-13 PROCEDURE — 84156 ASSAY OF PROTEIN URINE: CPT

## 2018-02-13 PROCEDURE — 80048 BASIC METABOLIC PNL TOTAL CA: CPT

## 2018-02-13 RX ORDER — DEXTROSE 50 % IN WATER 50 %
25 SYRINGE (ML) INTRAVENOUS ONCE
Qty: 0 | Refills: 0 | Status: DISCONTINUED | OUTPATIENT
Start: 2018-02-13 | End: 2018-02-13

## 2018-02-13 RX ORDER — FUROSEMIDE 40 MG
1 TABLET ORAL
Qty: 30 | Refills: 0 | OUTPATIENT
Start: 2018-02-13 | End: 2018-03-14

## 2018-02-13 RX ORDER — INSULIN LISPRO 100/ML
VIAL (ML) SUBCUTANEOUS
Qty: 0 | Refills: 0 | Status: DISCONTINUED | OUTPATIENT
Start: 2018-02-13 | End: 2018-02-13

## 2018-02-13 RX ORDER — SODIUM CHLORIDE 9 MG/ML
1000 INJECTION, SOLUTION INTRAVENOUS
Qty: 0 | Refills: 0 | Status: DISCONTINUED | OUTPATIENT
Start: 2018-02-13 | End: 2018-02-13

## 2018-02-13 RX ORDER — FUROSEMIDE 40 MG
20 TABLET ORAL ONCE
Qty: 0 | Refills: 0 | Status: DISCONTINUED | OUTPATIENT
Start: 2018-02-13 | End: 2018-02-13

## 2018-02-13 RX ORDER — INSULIN GLARGINE 100 [IU]/ML
50 INJECTION, SOLUTION SUBCUTANEOUS
Qty: 15 | Refills: 0 | OUTPATIENT
Start: 2018-02-13 | End: 2018-03-14

## 2018-02-13 RX ORDER — DEXTROSE 50 % IN WATER 50 %
1 SYRINGE (ML) INTRAVENOUS ONCE
Qty: 0 | Refills: 0 | Status: DISCONTINUED | OUTPATIENT
Start: 2018-02-13 | End: 2018-02-13

## 2018-02-13 RX ORDER — ASPIRIN/CALCIUM CARB/MAGNESIUM 324 MG
1 TABLET ORAL
Qty: 0 | Refills: 0 | COMMUNITY
Start: 2018-02-13

## 2018-02-13 RX ORDER — INSULIN LISPRO 100/ML
26 VIAL (ML) SUBCUTANEOUS
Qty: 0 | Refills: 0 | COMMUNITY

## 2018-02-13 RX ORDER — ATORVASTATIN CALCIUM 80 MG/1
1 TABLET, FILM COATED ORAL
Qty: 30 | Refills: 0 | OUTPATIENT
Start: 2018-02-13 | End: 2018-03-14

## 2018-02-13 RX ORDER — GLUCAGON INJECTION, SOLUTION 0.5 MG/.1ML
1 INJECTION, SOLUTION SUBCUTANEOUS ONCE
Qty: 0 | Refills: 0 | Status: DISCONTINUED | OUTPATIENT
Start: 2018-02-13 | End: 2018-02-13

## 2018-02-13 RX ORDER — ACETYLCYSTEINE 200 MG/ML
1 VIAL (ML) MISCELLANEOUS
Qty: 14 | Refills: 0 | OUTPATIENT
Start: 2018-02-13 | End: 2018-02-19

## 2018-02-13 RX ORDER — INSULIN GLARGINE 100 [IU]/ML
50 INJECTION, SOLUTION SUBCUTANEOUS AT BEDTIME
Qty: 0 | Refills: 0 | Status: DISCONTINUED | OUTPATIENT
Start: 2018-02-13 | End: 2018-02-13

## 2018-02-13 RX ORDER — LISINOPRIL 2.5 MG/1
1 TABLET ORAL
Qty: 30 | Refills: 0 | OUTPATIENT
Start: 2018-02-13 | End: 2018-03-14

## 2018-02-13 RX ORDER — ATORVASTATIN CALCIUM 80 MG/1
1 TABLET, FILM COATED ORAL
Qty: 0 | Refills: 0 | COMMUNITY
Start: 2018-02-13

## 2018-02-13 RX ORDER — INSULIN GLARGINE 100 [IU]/ML
76 INJECTION, SOLUTION SUBCUTANEOUS
Qty: 0 | Refills: 0 | COMMUNITY

## 2018-02-13 RX ORDER — INSULIN LISPRO 100/ML
20 VIAL (ML) SUBCUTANEOUS
Qty: 9 | Refills: 0 | OUTPATIENT
Start: 2018-02-13 | End: 2018-03-14

## 2018-02-13 RX ORDER — METOPROLOL TARTRATE 50 MG
50 TABLET ORAL ONCE
Qty: 0 | Refills: 0 | Status: DISCONTINUED | OUTPATIENT
Start: 2018-02-13 | End: 2018-02-13

## 2018-02-13 RX ORDER — ASPIRIN/CALCIUM CARB/MAGNESIUM 324 MG
1 TABLET ORAL
Qty: 30 | Refills: 0 | OUTPATIENT
Start: 2018-02-13 | End: 2018-03-14

## 2018-02-13 RX ORDER — INSULIN LISPRO 100/ML
20 VIAL (ML) SUBCUTANEOUS
Qty: 0 | Refills: 0 | Status: DISCONTINUED | OUTPATIENT
Start: 2018-02-13 | End: 2018-02-13

## 2018-02-13 RX ORDER — LABETALOL HCL 100 MG
1 TABLET ORAL
Qty: 90 | Refills: 0 | OUTPATIENT
Start: 2018-02-13 | End: 2018-03-14

## 2018-02-13 RX ORDER — DEXTROSE 50 % IN WATER 50 %
12.5 SYRINGE (ML) INTRAVENOUS ONCE
Qty: 0 | Refills: 0 | Status: DISCONTINUED | OUTPATIENT
Start: 2018-02-13 | End: 2018-02-13

## 2018-02-13 RX ORDER — INSULIN LISPRO 100/ML
VIAL (ML) SUBCUTANEOUS AT BEDTIME
Qty: 0 | Refills: 0 | Status: DISCONTINUED | OUTPATIENT
Start: 2018-02-13 | End: 2018-02-13

## 2018-02-13 RX ORDER — ACETYLCYSTEINE 200 MG/ML
600 VIAL (ML) MISCELLANEOUS
Qty: 0 | Refills: 0 | COMMUNITY
Start: 2018-02-13

## 2018-02-13 RX ORDER — LISINOPRIL 2.5 MG/1
1 TABLET ORAL
Qty: 0 | Refills: 0 | COMMUNITY
Start: 2018-02-13

## 2018-02-13 RX ORDER — METOPROLOL TARTRATE 50 MG
1 TABLET ORAL
Qty: 60 | Refills: 0 | OUTPATIENT
Start: 2018-02-13 | End: 2018-03-14

## 2018-02-13 RX ADMIN — LISINOPRIL 10 MILLIGRAM(S): 2.5 TABLET ORAL at 05:45

## 2018-02-13 RX ADMIN — Medication 20 UNIT(S): at 07:57

## 2018-02-13 RX ADMIN — Medication 100 MILLIGRAM(S): at 05:45

## 2018-02-13 RX ADMIN — Medication 600 MILLIGRAM(S): at 05:45

## 2018-02-13 NOTE — DISCHARGE NOTE ADULT - NS AS ACTIVITY OBS
Showering allowed/Walking-Indoors allowed/No Heavy lifting/straining/Do not drive or operate machinery/Bathing allowed/Walking-Outdoors allowed

## 2018-02-13 NOTE — DISCHARGE NOTE ADULT - HOSPITAL COURSE
HPI:  52 M with PMH of DM-2 on insulin (endocrinologist Dr Aby Marsh, A1c 10.4, recent encounter), HTN and HLD presented to [facility] on 2/11 morning with worsening chest palpitations w/ diaphoresis, abdominal discomfort and found to be in SVT, s/p adenosine, had elevated troponin 0.074-->0.088-->0.072-->0.053 and was started on metoprolol, started on asprin, holding statin given transaminitis on admission,   now improved. Pt had elevated creatinine of 1.37 with unknown baseline, improved to 1.29. ACE inhibitor held given PATY. Stress test showed reduced EF of 34% w/o inducible ischemia. Pt denies CP, palpitations, SOB, diaphoresis, N/V since admission last Sunday. Pt transferred to [facility] on 2/13 for cardiac catheterization given suspicion for ischemic cardiomyopathy.  Pt s/p Rt and Lt heart catheterization via RFA access revealing mild irregularity of the coronary anatomy.  Pt tolerated procedure well, RFA access site benign w/o presence of bleeding/hematoma, pt w/o complaints, pedal pulses palpable. Pt's EDV 49, given furosemide 20mg IVP given with good response and starting on po 20mg daily and start on 50mg metoprolol BID as discussed with Dr Smiley.  Pt will be continued on meds started at [facility], lisinopril, atorvastatin except labetalol.  Pt will continue his home insulin management Basaglar 76 units daily and Humalog 26 units w/ each meal with his endocrinologist Fahad Ocasio.  Pt's Hem A1C 10.4, and recently established care with his endocrinologist.  Pt advised to follow-up with both cardiologist and endocrinologist in 1-2 weeks.          NST 2/12/18  IMPRESSIONS: Abnormal Study  * Negative ECG evidence of ischemia after IV of Lexiscan.  * Review of raw data shows: The study is of good technical  quality.  * The left ventricle was mildly dilated LV at baseline.  Normal myocardial perfusion scan, with no evidence of  infarction or inducible ischemia.  * Dilated LV with global hypokinesis EF=34%.    Echo 2/12/18  CONCLUSIONS:  1. Normal mitral valve. Mild mitral regurgitation.  2. Normal trileaflet aortic valve.  3. Aortic Root: 4.3 cm.  4. Mild left atrial enlargement.  5. Mild concentric left ventricular hypertrophy.  6. Severe global left ventricular systolic dysfunction  EF=30-35%.  7. Grade II diastolic dysfunction.  8. Normal right atrium.  9. Normal right ventricular size and function.  10. RV systolic pressure is moderately increased at  56 mm  Hg.  11. There is mild-moderate tricuspid regurgitation.  12. There is mild pulmonic regurgitation.  13. Normal pericardium with no pericardial effusion.    ------------------------------------------------------------------------  Revised on  2/13/2018 - 07:43:58 by Macy Rivas MD (13 Feb 2018 12:14) HPI:  52 M with PMH of DM-2 on insulin (endocrinologist Dr Aby Marsh, A1c 10.4, recent encounter), HTN and HLD presented to [facility] on 2/11 morning with worsening chest palpitations w/ diaphoresis, abdominal discomfort and found to be in SVT, s/p adenosine, had elevated troponin 0.074-->0.088-->0.072-->0.053 and was started on metoprolol, started on asprin, holding statin given transaminitis on admission,   now improved. Pt had elevated creatinine of 1.37 with unknown baseline, improved to 1.29. ACE inhibitor held given PATY. Stress test showed reduced EF of 34% w/o inducible ischemia. Pt denies CP, palpitations, SOB, diaphoresis, N/V since admission last Sunday. Pt transferred to [facility] on 2/13 for cardiac catheterization given suspicion for ischemic cardiomyopathy.  Pt s/p Rt and Lt heart catheterization via RFA access revealing mild irregularity of the coronary anatomy.  Pt tolerated procedure well, RFA access site benign w/o presence of bleeding/hematoma, pt w/o complaints, pedal pulses palpable. Pt's EDV 49, given furosemide 20mg IVP given with good response and starting on po 20mg daily and start on 50mg metoprolol BID as discussed with Dr Smiley.  Pt will be continued on meds started at [facility], lisinopril, atorvastatin except labetalol.  Pt will continue his home insulin management Basaglar 76 units daily and Humalog 26 units w/ each meal with his endocrinologist Fahad Ocasio.  Pt's Hem A1C 10.4, and recently established care with his endocrinologist.  Pt advised to follow-up with both cardiologist and endocrinologist in 1-2 weeks.      [facility]	    NST 2/12/18  IMPRESSIONS: Abnormal Study  * Negative ECG evidence of ischemia after IV of Lexiscan.  * Review of raw data shows: The study is of good technical  quality.  * The left ventricle was mildly dilated LV at baseline.  Normal myocardial perfusion scan, with no evidence of  infarction or inducible ischemia.  * Dilated LV with global hypokinesis EF=34%.    Echo 2/12/18  CONCLUSIONS:  1. Normal mitral valve. Mild mitral regurgitation.  2. Normal trileaflet aortic valve.  3. Aortic Root: 4.3 cm.  4. Mild left atrial enlargement.  5. Mild concentric left ventricular hypertrophy.  6. Severe global left ventricular systolic dysfunction  EF=30-35%.  7. Grade II diastolic dysfunction.  8. Normal right atrium.  9. Normal right ventricular size and function.  10. RV systolic pressure is moderately increased at  56 mm  Hg.  11. There is mild-moderate tricuspid regurgitation.  12. There is mild pulmonic regurgitation.  13. Normal pericardium with no pericardial effusion.    ------------------------------------------------------------------------  Revised on  2/13/2018 - 07:43:58 by Macy Rivas MD (13 Feb 2018 12:14)

## 2018-02-13 NOTE — H&P CARDIOLOGY - HISTORY OF PRESENT ILLNESS
52 M with PMH of DM-2 on insulin (endocrinologist Dr Aby Marsh, A1c 10.4, recent encounter), HTN and HLD presented to [facility] on 2/11 morning with worsening chest palpitations w/ diaphoresis, abdominal discomfort and found to be in SVT, s/p adenosine, had elevated troponin 0.074-->0.088-->0.072-->0.053 and was started on metoprolol, started on asprin, holding statin given transaminitis on admission,   now improved. Pt had elevated creatinine of 1.37 with unknown baseline, improved to 1.29. ACE inhibitor held given PATY. Stress test showed reduced EF of 34% w/o inducible ischemia. Pt denies CP, palpitations, SOB, diaphoresis, N/V since admission last Sunday. Pt transferred to [facility] on 2/13 for cardiac catheterization given suspicion for ischemic cardiomyopathy.     NST 2/12/18  IMPRESSIONS:Abnormal Study  * Negative ECG evidence of ischemia after IV of Lexiscan.  * Review of raw data shows: The study is of good technical  quality.  * The left ventricle was mildly dilated LV at baseline.  Normal myocardial perfusion scan, with no evidence of  infarction or inducible ischemia.  * Dilated LV with global hypokinesis EF=34%.    Echo 2/12/18  CONCLUSIONS:  1. Normal mitral valve. Mild mitral regurgitation.  2. Normal trileaflet aortic valve.  3. Aortic Root: 4.3 cm.  4. Mild left atrial enlargement.  5. Mild concentric left ventricular hypertrophy.  6. Severe global left ventricular systolic dysfunction  EF=30-35%.  7. Grade II diastolic dysfunction.  8. Normal right atrium.  9. Normal right ventricular size and function.  10. RV systolic pressure is moderately increased at  56 mm  Hg.  11. There is mild-moderate tricuspid regurgitation.  12. There is mild pulmonic regurgitation.  13. Normal pericardium with no pericardial effusion.    ------------------------------------------------------------------------  Revised on  2/13/2018 - 07:43:58 by Macy Rivas MD

## 2018-02-13 NOTE — DISCHARGE NOTE ADULT - MEDICATION SUMMARY - MEDICATIONS TO TAKE
I will START or STAY ON the medications listed below when I get home from the hospital:    aspirin 81 mg oral tablet, chewable  -- 1 tab(s) by mouth once a day  hospital MDD:1  -- Indication: For prevention of further plaque formation    lisinopril 10 mg oral tablet  -- 1 tab(s) by mouth once a day  hospital MDD:1  -- Indication: For High blood pressure management    Basaglar KwikPen 100 units/mL subcutaneous solution  -- 76 unit(s) subcutaneous once a day (at bedtime)    home  -- Indication: For diabetes management    HumaLOG KwikPen 100 units/mL injectable solution  -- 26 unit(s) injectable 3 times a day (before meals)     -- Indication: For diabetes management    atorvastatin 40 mg oral tablet  -- 1 tab(s) by mouth once a day (at bedtime)  hospital MDD:1  -- Indication: For further management of your elevated cholesterol    metoprolol tartrate 50 mg oral tablet  -- 1 tab(s) by mouth 2 times a day MDD:2  -- It is very important that you take or use this exactly as directed.  Do not skip doses or discontinue unless directed by your doctor.  May cause drowsiness.  Alcohol may intensify this effect.  Use care when operating dangerous machinery.  Some non-prescription drugs may aggravate your condition.  Read all labels carefully.  If a warning appears, check with your doctor before taking.  Take with food or milk.  This drug may impair the ability to drive or operate machinery.  Use care until you become familiar with its effects.    -- Indication: For mangement of high blood pressure     furosemide 20 mg oral tablet  -- 1 tab(s) by mouth once a day MDD:1  -- Avoid prolonged or excessive exposure to direct and/or artificial sunlight while taking this medication.  It is very important that you take or use this exactly as directed.  Do not skip doses or discontinue unless directed by your doctor.  It may be advisable to drink a full glass orange juice or eat a banana daily while taking this medication.    -- Indication: For prevention of fluid retention

## 2018-02-13 NOTE — DISCHARGE NOTE ADULT - SECONDARY DIAGNOSIS.
Hypertension, unspecified type Type 2 diabetes mellitus with complication, without long-term current use of insulin Hyperlipidemia, unspecified hyperlipidemia type

## 2018-02-13 NOTE — DISCHARGE NOTE ADULT - MEDICATION SUMMARY - MEDICATIONS TO STOP TAKING
I will STOP taking the medications listed below when I get home from the hospital:    insulin glargine 100 units/mL subcutaneous solution  -- 50 unit(s) subcutaneous once a day (at bedtime) x 30 days   hospital  -- Do not drink alcoholic beverages when taking this medication.  It is very important that you take or use this exactly as directed.  Do not skip doses or discontinue unless directed by your doctor.  Keep in refrigerator.  Do not freeze.    acetylcysteine 600 mg oral capsule  -- 1 cap(s) by mouth 2 times a day   hospital  -- Take with food or milk.    labetalol 100 mg oral tablet  -- 1 tab(s) by mouth 3 times a day  hospital

## 2018-02-13 NOTE — DISCHARGE NOTE ADULT - PATIENT PORTAL LINK FT
You can access the [facility] Patient Portal, offered by [facility], by registering with the following website: [email or web address]

## 2018-02-13 NOTE — DISCHARGE NOTE ADULT - PLAN OF CARE
You will not be short of breath. Take your medications as prescribed. Follow a low-salt, low salt, low cholesterol heart healthy diet. Weigh yourself every day and keep a record; call your doctor if you gain 2 pounds over one to two days or 5 pounds over three days. Get to or maintain a healthy weight; ask your heart failure team for referrals to a registered dietitian if needed. Avoid alcohol. Be active (check with your physician or cardiologist first). Find healthy ways to deal with stress, such as deep breathing, meditation, exercise, and doing hobbies that you enjoy. If you smoke, quit. (A resource to help you stop smoking is the [facility] – phone number 537-412-5869.). Your blood pressure will be controlled. Continue with your blood pressure medications; eat a heart healthy diet with low salt diet; exercise regularly (consult with your physician or cardiologist first); maintain a heart healthy weight; if you smoke - quit (A resource to help you stop smoking is the [facility] – phone number 725-816-6446.); include healthy ways to manage stress. Continue to follow with your primary care physician or cardiologist. Your hemoglobin A1C will be at a normal range (normal range is from 6-8) Continue to follow with your primary care MD or your endocrinologist. Discuss what the goal hemoglobin A1C level is for you.  Follow a heart healthy diabetic diet. If you check your fingerstick glucose at home, call your MD if it is greater than 250mg/dL on 2 occasions or less than 100mg/dL on 2 occasions. Know signs of low blood sugar, such as: dizziness, shakiness, sweating, confusion, hunger, nervousness- drink 4 ounces apple juice if occurs and call your doctor. Know early signs of high blood sugar, such as: frequent urination, increased thirst, blurry vision, fatigue, headache - call your doctor if this occurs. Your LDL cholesterol will be less than 70mg/dL Continue with your cholesterol medications. Eat a heart healthy diet that is low in saturated fats and salt, and includes whole grains, fruits, vegetables and lean protein; exercise regularly (consult with your physician or cardiologist first); maintain a heart healthy weight; if you smoke - quit (A resource to help you stop smoking is the [facility] – phone number 146-306-2628.). Continue to follow with your primary physician or cardiologist.

## 2018-02-13 NOTE — H&P CARDIOLOGY - PMH
No pertinent past medical history Essential hypertension    Hypertension, unspecified type    Type 2 diabetes mellitus with complication, without long-term current use of insulin

## 2018-02-13 NOTE — CHART NOTE - NSCHARTNOTEFT_GEN_A_CORE
< from: Transthoracic Echocardiogram (02.12.18 @ 07:26) >    CONCLUSIONS:  1. Normal mitral valve. Mild mitral regurgitation.  2. Normal trileaflet aortic valve.  3. Aortic Root: 4.3 cm.  4. Mild left atrial enlargement.  5. Mild concentric left ventricular hypertrophy.  6. Severe global left ventricular systolic dysfunction  EF=30-35%.  7. Grade II diastolic dysfunction.  8. Normal right atrium.  9. Normal right ventricular size and function.  10. RV systolic pressure is moderately increased at  56 mm  Hg.  11. There is mild-moderate tricuspid regurgitation.  12. There is mild pulmonic regurgitation.  13. Normal pericardium with no pericardial effusion.    ------------------------------------------------------------------------  Revised on  2/13/2018 - 07:43:58 by Macy Rivas MD  Confirmed on  2/13/2018 - 07:44:39 by Macy Rivas MD    < end of copied text >    < from: Cardiac Cath Lab - Adult (02.13.18 @ 13:17) >    VENTRICLES: No left ventriculogram was performed.  CORONARY VESSELS: The coronary circulation is co-dominant.  LM:   --  LM: Normal.  LAD:   --  LAD: Angiography showed minor luminal irregularities with no  flow limiting lesions.  CX:   --  Circumflex: Angiography showed minor luminal irregularities with  no flow limiting lesions.  RCA:   --  RCA: Angiography showed minor luminal irregularities with no  flow limiting lesions.  COMPLICATIONS: There were no complications.  DIAGNOSTIC IMPRESSIONS: There is mild irregularity of the coronary anatomy.  DIAGNOSTIC RECOMMENDATIONS: Patient management should include aggressive  medical therapy.    < end of copied text >    HPI:  52 M with PMH of DM-2 on insulin (endocrinologist Dr Aby Marsh, A1c 10.4, recent encounter), HTN and HLD presented to [facility] on 2/11 morning with worsening chest palpitations w/ diaphoresis, abdominal discomfort and found to be in SVT, s/p adenosine, had elevated troponin 0.074-->0.088-->0.072-->0.053 and was started on metoprolol, started on asprin, holding statin given transaminitis on admission,   now improved. Pt had elevated creatinine of 1.37 with unknown baseline, improved to 1.29. ACE inhibitor held given PATY. Stress test showed reduced EF of 34% w/o inducible ischemia. Pt denies CP, palpitations, SOB, diaphoresis, N/V since admission last Sunday. Pt transferred to [facility] on 2/13 for cardiac catheterization given suspicion for ischemic cardiomyopathy.     NST 2/12/18  IMPRESSIONS:Abnormal Study  * Negative ECG evidence of ischemia after IV of Lexiscan.  * Review of raw data shows: The study is of good technical  quality.  * The left ventricle was mildly dilated LV at baseline.  Normal myocardial perfusion scan, with no evidence of  infarction or inducible ischemia.  * Dilated LV with global hypokinesis EF=34%.    Echo 2/12/18  CONCLUSIONS:  1. Normal mitral valve. Mild mitral regurgitation.  2. Normal trileaflet aortic valve.  3. Aortic Root: 4.3 cm.  4. Mild left atrial enlargement.  5. Mild concentric left ventricular hypertrophy.  6. Severe global left ventricular systolic dysfunction  EF=30-35%.  7. Grade II diastolic dysfunction.  8. Normal right atrium.  9. Normal right ventricular size and function.  10. RV systolic pressure is moderately increased at  56 mm  Hg.  11. There is mild-moderate tricuspid regurgitation.  12. There is mild pulmonic regurgitation.  13. Normal pericardium with no pericardial effusion.    ------------------------------------------------------------------------  Revised on  2/13/2018 - 07:43:58 by Macy Rivas MD (13 Feb 2018 12:14)    HPI:  52 M with PMH of DM-2 on insulin (endocrinologist Dr Aby Marsh, A1c 10.4, recent encounter), HTN and HLD presented to [facility] on 2/11 morning with worsening chest palpitations w/ diaphoresis, abdominal discomfort and found to be in SVT, s/p adenosine, had elevated troponin 0.074-->0.088-->0.072-->0.053 and was started on metoprolol, started on asprin, holding statin given transaminitis on admission,   now improved. Pt had elevated creatinine of 1.37 with unknown baseline, improved to 1.29. ACE inhibitor held given PATY. Stress test showed reduced EF of 34% w/o inducible ischemia. Pt denies CP, palpitations, SOB, diaphoresis, N/V since admission last Sunday. Pt transferred to [facility] on 2/13 for cardiac catheterization given suspicion for ischemic cardiomyopathy.     NST 2/12/18  IMPRESSIONS:Abnormal Study  * Negative ECG evidence of ischemia after IV of Lexiscan.  * Review of raw data shows: The study is of good technical  quality.  * The left ventricle was mildly dilated LV at baseline.  Normal myocardial perfusion scan, with no evidence of  infarction or inducible ischemia.  * Dilated LV with global hypokinesis EF=34%.    Echo 2/12/18  CONCLUSIONS:  1. Normal mitral valve. Mild mitral regurgitation.  2. Normal trileaflet aortic valve.  3. Aortic Root: 4.3 cm.  4. Mild left atrial enlargement.  5. Mild concentric left ventricular hypertrophy.  6. Severe global left ventricular systolic dysfunction  EF=30-35%.  7. Grade II diastolic dysfunction.  8. Normal right atrium.  9. Normal right ventricular size and function.  10. RV systolic pressure is moderately increased at  56 mm  Hg.  11. There is mild-moderate tricuspid regurgitation.  12. There is mild pulmonic regurgitation.  13. Normal pericardium with no pericardial effusion.    ------------------------------------------------------------------------  Revised on  2/13/2018 - 07:43:58 by Macy Rivas MD (13 Feb 2018 12:14)    52 M with PMH of DM-2 on insulin (endocrinologist Dr Aby Marsh, A1c 10.4, recent encounter), HTN and HLD presented to [facility] on 2/11 morning with worsening chest palpitations w/ diaphoresis, abdominal discomfort and found to be in SVT, s/p adenosine, had elevated troponin 0.074-->0.088-->0.072-->0.053 and was started on metoprolol, started on aspirin, holding statin given transaminitis on admission,   now improved. Pt had elevated creatinine of 1.37 with unknown baseline, improved to 1.29. ACE inhibitor held given PATY. Stress test showed reduced EF of 34% w/o inducible ischemia. Pt denies CP, palpitations, SOB, diaphoresis, N/V since admission last Sunday. Pt transferred to [facility] on 2/13 for cardiac catheterization s/p diagnostic cath with luminal irregularities.      Plan:  Life vest for non-ischemic cardiomyopathy to prevent sudden death  C/W medical therapy   lasix 20mg IVP x 1 for elevated EDP  DM management as per Endocrine  f/u with Dr. Smiley in 1-2 weeks  D/C home today after life vest fitting if pt remains stable.      Frances Gonzalez, NP-C   Cardiology < from: Transthoracic Echocardiogram (02.12.18 @ 07:26) >    CONCLUSIONS:  1. Normal mitral valve. Mild mitral regurgitation.  2. Normal trileaflet aortic valve.  3. Aortic Root: 4.3 cm.  4. Mild left atrial enlargement.  5. Mild concentric left ventricular hypertrophy.  6. Severe global left ventricular systolic dysfunction  EF=30-35%.  7. Grade II diastolic dysfunction.  8. Normal right atrium.  9. Normal right ventricular size and function.  10. RV systolic pressure is moderately increased at  56 mm  Hg.  11. There is mild-moderate tricuspid regurgitation.  12. There is mild pulmonic regurgitation.  13. Normal pericardium with no pericardial effusion.    ------------------------------------------------------------------------  Revised on  2/13/2018 - 07:43:58 by Macy Rivas MD  Confirmed on  2/13/2018 - 07:44:39 by Macy Rivas MD    < end of copied text >    < from: Cardiac Cath Lab - Adult (02.13.18 @ 13:17) >    VENTRICLES: No left ventriculogram was performed.  CORONARY VESSELS: The coronary circulation is co-dominant.  LM:   --  LM: Normal.  LAD:   --  LAD: Angiography showed minor luminal irregularities with no  flow limiting lesions.  CX:   --  Circumflex: Angiography showed minor luminal irregularities with  no flow limiting lesions.  RCA:   --  RCA: Angiography showed minor luminal irregularities with no  flow limiting lesions.  COMPLICATIONS: There were no complications.  DIAGNOSTIC IMPRESSIONS: There is mild irregularity of the coronary anatomy.  DIAGNOSTIC RECOMMENDATIONS: Patient management should include aggressive  medical therapy.    < end of copied text >    < from: Nuclear Stress Test-Pharmacologic (02.12.18 @ 08:51) >    IMPRESSIONS:Abnormal Study  * Negative ECG evidence of ischemia after IV of Lexiscan.  * Review of raw data shows: The study is of good technical  quality.  * The left ventricle was mildly dilated LV at baseline.  Normal myocardial perfusion scan, with no evidence of  infarction or inducible ischemia.  * Dilated LV with global hypokinesis EF=34%.    ------------------------------------------------------------------------      ------------------------------------------------------------------------    Confirmed on  2/12/2018 - 13:13:32 at [place] by  Macy Rivas MD    < end of copied text >        HPI:  52 M with PMH of DM-2 on insulin (endocrinologist Dr Aby Marsh, A1c 10.4, recent encounter), HTN and HLD presented to [facility] on 2/11 morning with worsening chest palpitations w/ diaphoresis, abdominal discomfort and found to be in SVT, s/p adenosine, had elevated troponin 0.074-->0.088-->0.072-->0.053 and was started on metoprolol, started on asprin, holding statin given transaminitis on admission,   now improved. Pt had elevated creatinine of 1.37 with unknown baseline, improved to 1.29. ACE inhibitor held given PATY. Stress test showed reduced EF of 34% w/o inducible ischemia. Pt denies CP, palpitations, SOB, diaphoresis, N/V since admission last Sunday. Pt transferred to [facility] on 2/13 for cardiac catheterization given suspicion for ischemic cardiomyopathy.     NST 2/12/18  IMPRESSIONS:Abnormal Study  * Negative ECG evidence of ischemia after IV of Lexiscan.  * Review of raw data shows: The study is of good technical  quality.  * The left ventricle was mildly dilated LV at baseline.  Normal myocardial perfusion scan, with no evidence of  infarction or inducible ischemia.  * Dilated LV with global hypokinesis EF=34%.    Echo 2/12/18  CONCLUSIONS:  1. Normal mitral valve. Mild mitral regurgitation.  2. Normal trileaflet aortic valve.  3. Aortic Root: 4.3 cm.  4. Mild left atrial enlargement.  5. Mild concentric left ventricular hypertrophy.  6. Severe global left ventricular systolic dysfunction  EF=30-35%.  7. Grade II diastolic dysfunction.  8. Normal right atrium.  9. Normal right ventricular size and function.  10. RV systolic pressure is moderately increased at  56 mm  Hg.  11. There is mild-moderate tricuspid regurgitation.  12. There is mild pulmonic regurgitation.  13. Normal pericardium with no pericardial effusion.    ------------------------------------------------------------------------  Revised on  2/13/2018 - 07:43:58 by Macy Rivas MD (13 Feb 2018 12:14)    HPI:  52 M with PMH of DM-2 on insulin (endocrinologist Dr Aby Marsh, A1c 10.4, recent encounter), HTN and HLD presented to [facility] on 2/11 morning with worsening chest palpitations w/ diaphoresis, abdominal discomfort and found to be in SVT, s/p adenosine, had elevated troponin 0.074-->0.088-->0.072-->0.053 and was started on metoprolol, started on asprin, holding statin given transaminitis on admission,   now improved. Pt had elevated creatinine of 1.37 with unknown baseline, improved to 1.29. ACE inhibitor held given PATY. Stress test showed reduced EF of 34% w/o inducible ischemia. Pt denies CP, palpitations, SOB, diaphoresis, N/V since admission last Sunday. Pt transferred to [facility] on 2/13 for cardiac catheterization given suspicion for ischemic cardiomyopathy.     NST 2/12/18  IMPRESSIONS:Abnormal Study  * Negative ECG evidence of ischemia after IV of Lexiscan.  * Review of raw data shows: The study is of good technical  quality.  * The left ventricle was mildly dilated LV at baseline.  Normal myocardial perfusion scan, with no evidence of  infarction or inducible ischemia.  * Dilated LV with global hypokinesis EF=34%.    Echo 2/12/18  CONCLUSIONS:  1. Normal mitral valve. Mild mitral regurgitation.  2. Normal trileaflet aortic valve.  3. Aortic Root: 4.3 cm.  4. Mild left atrial enlargement.  5. Mild concentric left ventricular hypertrophy.  6. Severe global left ventricular systolic dysfunction  EF=30-35%.  7. Grade II diastolic dysfunction.  8. Normal right atrium.  9. Normal right ventricular size and function.  10. RV systolic pressure is moderately increased at  56 mm  Hg.  11. There is mild-moderate tricuspid regurgitation.  12. There is mild pulmonic regurgitation.  13. Normal pericardium with no pericardial effusion.    ------------------------------------------------------------------------  Revised on  2/13/2018 - 07:43:58 by Macy Rivas MD (13 Feb 2018 12:14)    52 M with PMH of DM-2 on insulin (endocrinologist Dr Aby Marsh, A1c 10.4, recent encounter), HTN and HLD presented to [facility] on 2/11 morning with worsening chest palpitations w/ diaphoresis, abdominal discomfort and found to be in SVT, s/p adenosine, had elevated troponin 0.074-->0.088-->0.072-->0.053 and was started on metoprolol, started on aspirin, holding statin given transaminitis on admission,   now improved. Pt had elevated creatinine of 1.37 with unknown baseline, improved to 1.29. ACE inhibitor held given PATY. Stress test showed reduced EF of 34% w/o inducible ischemia. Pt denies CP, palpitations, SOB, diaphoresis, N/V since admission last Sunday. Pt transferred to [facility] on 2/13 for cardiac catheterization s/p diagnostic cath with luminal irregularities.      Plan:  Life vest for non-ischemic dilated cardiomyopathy to prevent sudden death  C/W medical therapy   lasix 20mg IVP x 1 for elevated EDP  DM management as per Endocrine  f/u with Dr. Smiley in 1-2 weeks  D/C home today after life vest fitting if pt remains stable.      Frances Gonzalez, NP-C   Cardiology < from: Transthoracic Echocardiogram (02.12.18 @ 07:26) >    CONCLUSIONS:  1. Normal mitral valve. Mild mitral regurgitation.  2. Normal trileaflet aortic valve.  3. Aortic Root: 4.3 cm.  4. Mild left atrial enlargement.  5. Mild concentric left ventricular hypertrophy.  6. Severe global left ventricular systolic dysfunction  EF=30-35%.  7. Grade II diastolic dysfunction.  8. Normal right atrium.  9. Normal right ventricular size and function.  10. RV systolic pressure is moderately increased at  56 mm  Hg.  11. There is mild-moderate tricuspid regurgitation.  12. There is mild pulmonic regurgitation.  13. Normal pericardium with no pericardial effusion.    ------------------------------------------------------------------------  Revised on  2/13/2018 - 07:43:58 by Macy Rivas MD  Confirmed on  2/13/2018 - 07:44:39 by Macy Rivas MD    < end of copied text >    < from: Cardiac Cath Lab - Adult (02.13.18 @ 13:17) >    VENTRICLES: No left ventriculogram was performed.  CORONARY VESSELS: The coronary circulation is co-dominant.  LM:   --  LM: Normal.  LAD:   --  LAD: Angiography showed minor luminal irregularities with no  flow limiting lesions.  CX:   --  Circumflex: Angiography showed minor luminal irregularities with  no flow limiting lesions.  RCA:   --  RCA: Angiography showed minor luminal irregularities with no  flow limiting lesions.  COMPLICATIONS: There were no complications.  DIAGNOSTIC IMPRESSIONS: There is mild irregularity of the coronary anatomy.  DIAGNOSTIC RECOMMENDATIONS: Patient management should include aggressive  medical therapy.    < end of copied text >    < from: Nuclear Stress Test-Pharmacologic (02.12.18 @ 08:51) >    IMPRESSIONS:Abnormal Study  * Negative ECG evidence of ischemia after IV of Lexiscan.  * Review of raw data shows: The study is of good technical  quality.  * The left ventricle was mildly dilated LV at baseline.  Normal myocardial perfusion scan, with no evidence of  infarction or inducible ischemia.  * Dilated LV with global hypokinesis EF=34%.      Confirmed on  2/12/2018 - 13:13:32 at [place] by  Macy Rivas MD          52 M with PMH of DM-2 on insulin (endocrinologist Dr Aby Marsh, A1c 10.4, recent encounter), HTN and HLD presented to [facility] on 2/11 morning with worsening chest palpitations w/ diaphoresis, abdominal discomfort and found to be in SVT, s/p adenosine, had elevated troponin 0.074-->0.088-->0.072-->0.053 and was started on metoprolol, started on aspirin, holding statin given transaminitis on admission,   now improved. Pt had elevated creatinine of 1.37 with unknown baseline, improved to 1.29. ACE inhibitor held given PATY. Stress test showed reduced EF of 34% w/o inducible ischemia. Pt denies CP, palpitations, SOB, diaphoresis, N/V since admission last Sunday. Pt transferred to [facility] on 2/13 for cardiac catheterization s/p diagnostic cath with luminal irregularities.      Plan:  Life vest for non-ischemic dilated cardiomyopathy to prevent sudden death  C/W medical therapy   lasix 20mg IVP x 1 for elevated EDP  DM management as per Endocrine  f/u with Dr. Smiley in 1-2 weeks  D/C home today after life vest fitting if pt remains stable.      Frances Gonzalez, NP-C   Cardiology

## 2018-02-13 NOTE — DISCHARGE NOTE ADULT - CARE PLAN
Principal Discharge DX:	Dilated cardiomyopathy  Goal:	You will not be short of breath.  Assessment and plan of treatment:	Take your medications as prescribed. Follow a low-salt, low salt, low cholesterol heart healthy diet. Weigh yourself every day and keep a record; call your doctor if you gain 2 pounds over one to two days or 5 pounds over three days. Get to or maintain a healthy weight; ask your heart failure team for referrals to a registered dietitian if needed. Avoid alcohol. Be active (check with your physician or cardiologist first). Find healthy ways to deal with stress, such as deep breathing, meditation, exercise, and doing hobbies that you enjoy. If you smoke, quit. (A resource to help you stop smoking is the [facility] [employer] [facility] – phone number [phone].).  Secondary Diagnosis:	Hypertension, unspecified type  Goal:	Your blood pressure will be controlled.  Assessment and plan of treatment:	Continue with your blood pressure medications; eat a heart healthy diet with low salt diet; exercise regularly (consult with your physician or cardiologist first); maintain a heart healthy weight; if you smoke - quit (A resource to help you stop smoking is the [facility] [employer] [facility] – phone number [phone].); include healthy ways to manage stress. Continue to follow with your primary care physician or cardiologist.  Secondary Diagnosis:	Type 2 diabetes mellitus with complication, without long-term current use of insulin  Goal:	Your hemoglobin A1C will be at a normal range (normal range is from 6-8)  Assessment and plan of treatment:	Continue to follow with your primary care MD or your endocrinologist. Discuss what the goal hemoglobin A1C level is for you.  Follow a heart healthy diabetic diet. If you check your fingerstick glucose at home, call your MD if it is greater than 250mg/dL on 2 occasions or less than 100mg/dL on 2 occasions. Know signs of low blood sugar, such as: dizziness, shakiness, sweating, confusion, hunger, nervousness- drink 4 ounces apple juice if occurs and call your doctor. Know early signs of high blood sugar, such as: frequent urination, increased thirst, blurry vision, fatigue, headache - call your doctor if this occurs.  Secondary Diagnosis:	Hyperlipidemia, unspecified hyperlipidemia type  Goal:	Your LDL cholesterol will be less than 70mg/dL  Assessment and plan of treatment:	Continue with your cholesterol medications. Eat a heart healthy diet that is low in saturated fats and salt, and includes whole grains, fruits, vegetables and lean protein; exercise regularly (consult with your physician or cardiologist first); maintain a heart healthy weight; if you smoke - quit (A resource to help you stop smoking is the [facility] – phone number [phone].). Continue to follow with your primary physician or cardiologist.

## 2018-02-13 NOTE — DISCHARGE NOTE ADULT - FINDINGS/TREATMENT
Mild irregularity of the coronary anatomy.  Pt tolerated procedure well, RFA access site benign w/o presence of bleeding/hematoma, pt w/o complaints, pedal pulses palpable. Pt's EDV 49.  Started on furosemide 20mg, metoprolol 50mg BID. ASA 81mg, lisinopril 10mg, atorvastatin 40mg.

## 2018-02-13 NOTE — H&P CARDIOLOGY - FAMILY HISTORY
Family history of diabetes mellitus type II     Mother  Still living? No  Family history of hypertension, Age at diagnosis: Age Unknown     Father  Still living? Unknown  Family history of gastric cancer, Age at diagnosis: Age Unknown

## 2018-02-13 NOTE — PROGRESS NOTE ADULT - SUBJECTIVE AND OBJECTIVE BOX
CHIEF COMPLAINT:Patient is a 52y old  Male who presents with a chief complaint of Palpitations x 2 days. Pt appears comfortable.    	  REVIEW OF SYSTEMS:  CONSTITUTIONAL: No fever, weight loss, or fatigue  EYES: No eye pain, visual disturbances, or discharge  ENT:  No difficulty hearing, tinnitus, vertigo; No sinus or throat pain  NECK: No pain or stiffness  RESPIRATORY: No cough, wheezing, chills or hemoptysis; No Shortness of Breath  CARDIOVASCULAR: No chest pain, palpitations, passing out, dizziness, or leg swelling  GASTROINTESTINAL: No abdominal or epigastric pain. No nausea, vomiting, or hematemesis; No diarrhea or constipation. No melena or hematochezia.  GENITOURINARY: No dysuria, frequency, hematuria, or incontinence  NEUROLOGICAL: No headaches, memory loss, loss of strength, numbness, or tremors  SKIN: No itching, burning, rashes, or lesions   LYMPH Nodes: No enlarged glands  ENDOCRINE: No heat or cold intolerance; No hair loss  MUSCULOSKELETAL: No joint pain or swelling; No muscle, back, or extremity pain  PSYCHIATRIC: No depression, anxiety, mood swings, or difficulty sleeping  HEME/LYMPH: No easy bruising, or bleeding gums  ALLERGY AND IMMUNOLOGIC: No hives or eczema	      PHYSICAL EXAM:  T(C): 37.1 (02-13-18 @ 08:06), Max: 37.2 (02-12-18 @ 23:45)  HR: 85 (02-13-18 @ 08:06) (85 - 96)  BP: 145/84 (02-13-18 @ 08:06) (131/79 - 169/96)  RR: 17 (02-13-18 @ 08:06) (17 - 19)  SpO2: 94% (02-13-18 @ 08:06) (94% - 96%)    12 Feb 2018 07:01  -  13 Feb 2018 07:00  --------------------------------------------------------  IN: 636 mL / OUT: 0 mL / NET: 636 mL        Appearance: Normal	  HEENT:   Normal oral mucosa, PERRL, EOMI	  Lymphatic: No lymphadenopathy  Cardiovascular: Normal S1 S2, No JVD, No murmurs, No edema  Respiratory: Lungs clear to auscultation	  Psychiatry: A & O x 3, Mood & affect appropriate  Gastrointestinal:  Soft, Non-tender, + BS	  Skin: No rashes, No ecchymoses, No cyanosis	  Neurologic: Non-focal  Extremities: Normal range of motion, No clubbing, cyanosis or edema  Vascular: Peripheral pulses palpable 2+ bilaterally    MEDICATIONS  (STANDING):  acetylcysteine  Oral Solution 600 milliGRAM(s) Oral two times a day  aspirin  chewable 81 milliGRAM(s) Oral daily  atorvastatin 40 milliGRAM(s) Oral at bedtime  insulin glargine Injectable (LANTUS) 50 Unit(s) SubCutaneous at bedtime  insulin lispro Injectable (HumaLOG) 20 Unit(s) SubCutaneous three times a day before meals  labetalol 100 milliGRAM(s) Oral three times a day  lisinopril 10 milliGRAM(s) Oral daily        	  LABS:	 	    CARDIAC MARKERS:  CARDIAC MARKERS ( 12 Feb 2018 07:43 )  0.053 ng/mL / x     / 97 U/L / x     / <1.0 ng/mL  CARDIAC MARKERS ( 11 Feb 2018 23:29 )  0.072 ng/mL / x     / 103 U/L / x     / <1.0 ng/mL  CARDIAC MARKERS ( 11 Feb 2018 14:10 )  0.088 ng/mL / x     / 111 U/L / x     / <1.0 ng/mL                          13.8   7.2   )-----------( 232      ( 13 Feb 2018 05:43 )             45.2     02-13    140  |  104  |  15  ----------------------------<  196<H>  4.2   |  29  |  1.29    Ca    8.8      13 Feb 2018 05:43  Phos  3.5     02-13  Mg     2.0     02-13    TPro  7.0  /  Alb  3.4<L>  /  TBili  0.5  /  DBili  x   /  AST  46<H>  /  ALT  205<H>  /  AlkPhos  85  02-13      Lipid Profile: Cholesterol 221    HDL 62      HgA1c: Hemoglobin A1C, Whole Blood: 10.4 % (02-11 @ 21:13)    TSH: Thyroid Stimulating Hormone, Serum: 0.95 uU/mL (02-12 @ 07:43)  Thyroid Stimulating Hormone, Serum: 1.35 uU/mL (02-11 @ 11:50)    Echocardiogram:    OBSERVATIONS:  Mitral Valve: Normal mitral valve. Mild mitral  regurgitation.  Aortic Root: Aortic Root: 4.3 cm.    Aortic Valve: Normal trileaflet aortic valve.  Left Atrium: Mild left atrial enlargement.  Left Ventricle: Severe global left ventricular systolic  dysfunction EF=30-35%. Mild concentric left ventricular  hypertrophy. Grade II diastolic dysfunction.  Right Heart: Normal right atrium. Normal right ventricular  size and function. There is mild-moderate tricuspid  regurgitation. There is mild pulmonic regurgitation.  Pericardium/PleuraNormal pericardium with no pericardial  effusion.  Hemodynamic: RV systolic pressure is moderately increased  at  56 mm Hg.  	    Stress test:  IMPRESSIONS:Abnormal Study  * Negative ECG evidence of ischemia after IV of Lexiscan.  * Review of raw data shows: The study is of good technical  quality.  * The left ventricle was mildly dilated LV at baseline.  Normal myocardial perfusion scan, with no evidence of  infarction or inducible ischemia.  * Dilated LV with global hypokinesis EF=34%.

## 2018-02-13 NOTE — PROGRESS NOTE ADULT - ASSESSMENT
52 yr old  Male with PMH of DM-2, Htn and Hld presented with palpitations since last 2 days. Patient states that he started to have palpitations, on and off more at night time, associated with shortness of breath., s/p adenosine for SVT.  1.Tele monitoring.  2.D/W pt and his wife given Severe LV dyfumction on echo and stress, possible multi-vessel disease (false negative stress test),agree to proceed with  cardiac cath today.  3.HTN- cont Labetalol and  ace.  4.R/O CESAR-sleep study as outpatient (Pulm HTN on Echo).  5.Elevated LFT's-?fatty liver.  6.GI and DVT prophylaxis.  7.Weight loss.

## 2018-02-13 NOTE — DISCHARGE NOTE ADULT - CARE PROVIDER_API CALL
Chau Smiley (DO), Cardiology; Interventional Cardiology  [address] [place] [address]  Phone: [phone]  Fax: [phone]    Aby Marsh), EndocrinologyMetabDiabetes  [facility] [address] [address]  Phone: [phone]  Fax: [phone]

## 2020-01-14 NOTE — PROGRESS NOTE ADULT - SUBJECTIVE AND OBJECTIVE BOX
CHIEF COMPLAINT: Patient is a 52y old  Male who presents with a chief complaint of Palpitations x 2 days . Pt appears comfortable.    	  REVIEW OF SYSTEMS:  CONSTITUTIONAL: No fever, weight loss, or fatigue  EYES: No eye pain, visual disturbances, or discharge  ENT:  No difficulty hearing, tinnitus, vertigo; No sinus or throat pain  NECK: No pain or stiffness  RESPIRATORY: No cough, wheezing, chills or hemoptysis; No Shortness of Breath  CARDIOVASCULAR: No chest pain, palpitations, passing out, dizziness, or leg swelling  GASTROINTESTINAL: No abdominal or epigastric pain. No nausea, vomiting, or hematemesis; No diarrhea or constipation. No melena or hematochezia.  GENITOURINARY: No dysuria, frequency, hematuria, or incontinence  NEUROLOGICAL: No headaches, memory loss, loss of strength, numbness, or tremors  SKIN: No itching, burning, rashes, or lesions   LYMPH Nodes: No enlarged glands  ENDOCRINE: No heat or cold intolerance; No hair loss  MUSCULOSKELETAL: No joint pain or swelling; No muscle, back, or extremity pain  PSYCHIATRIC: No depression, anxiety, mood swings, or difficulty sleeping  HEME/LYMPH: No easy bruising, or bleeding gums  ALLERGY AND IMMUNOLOGIC: No hives or eczema	      PHYSICAL EXAM:  T(C): 37.3 (02-12-18 @ 07:23), Max: 37.3 (02-12-18 @ 07:23)  HR: 96 (02-12-18 @ 07:23) (94 - 96)  BP: 171/104 (02-12-18 @ 07:23) (151/91 - 171/104)  RR: 18 (02-12-18 @ 07:23) (18 - 18)  SpO2: 98% (02-12-18 @ 07:23) (94% - 98%)  Wt(kg): --  I&O's Summary      Appearance: Normal	  HEENT:   Normal oral mucosa, PERRL, EOMI	  Lymphatic: No lymphadenopathy  Cardiovascular: Normal S1 S2, No JVD, No murmurs, No edema  Respiratory: Lungs clear to auscultation	  Psychiatry: A & O x 3, Mood & affect appropriate  Gastrointestinal:  Soft, Non-tender, + BS	  Skin: No rashes, No ecchymoses, No cyanosis	  Neurologic: Non-focal  Extremities: Normal range of motion, No clubbing, cyanosis or edema  Vascular: Peripheral pulses palpable 2+ bilaterally    MEDICATIONS  (STANDING):  aspirin  chewable 81 milliGRAM(s) Oral daily  insulin glargine Injectable (LANTUS) 50 Unit(s) SubCutaneous at bedtime  insulin lispro Injectable (HumaLOG) 20 Unit(s) SubCutaneous three times a day before meals  labetalol 100 milliGRAM(s) Oral three times a day  lisinopril 5 milliGRAM(s) Oral daily  sodium chloride 0.45%. 1000 milliLiter(s) (80 mL/Hr) IV Continuous <Continuous>        	  LABS:	 	    CARDIAC MARKERS:  CARDIAC MARKERS ( 11 Feb 2018 23:29 )  0.072 ng/mL / x     / 103 U/L / x     / <1.0 ng/mL  CARDIAC MARKERS ( 11 Feb 2018 14:10 )  0.088 ng/mL / x     / 111 U/L / x     / <1.0 ng/mL  CARDIAC MARKERS ( 11 Feb 2018 08:07 )  0.074 ng/mL / x     / x     / x     / x                          13.7   6.7   )-----------( 228      ( 12 Feb 2018 07:43 )             43.3     02-12    142  |  108  |  18  ----------------------------<  141<H>  4.5   |  29  |  1.29    Ca    9.1      12 Feb 2018 07:43  Phos  4.4     02-12  Mg     2.1     02-12    TPro  7.2  /  Alb  3.7  /  TBili  0.5  /  DBili  0.1  /  AST  163<H>  /  ALT  321<H>  /  AlkPhos  94  02-12    HgA1c: Hemoglobin A1C, Whole Blood: 10.4 % (02-11 @ 21:13)    TSH: Thyroid Stimulating Hormone, Serum: 0.95 uU/mL (02-12 @ 07:43)  Thyroid Stimulating Hormone, Serum: 1.35 uU/mL (02-11 @ 11:50) [Follow-Up: _____] : a [unfilled] follow-up visit

## 2020-06-22 PROBLEM — I10 ESSENTIAL (PRIMARY) HYPERTENSION: Chronic | Status: ACTIVE | Noted: 2018-02-13

## 2020-06-22 PROBLEM — E11.8 TYPE 2 DIABETES MELLITUS WITH UNSPECIFIED COMPLICATIONS: Chronic | Status: ACTIVE | Noted: 2018-02-13

## 2020-07-01 ENCOUNTER — OUTPATIENT (OUTPATIENT)
Dept: OUTPATIENT SERVICES | Facility: HOSPITAL | Age: 55
LOS: 1 days | End: 2020-07-01
Payer: COMMERCIAL

## 2020-07-01 DIAGNOSIS — Z01.818 ENCOUNTER FOR OTHER PREPROCEDURAL EXAMINATION: ICD-10-CM

## 2020-07-01 PROCEDURE — 93306 TTE W/DOPPLER COMPLETE: CPT

## 2020-07-01 PROCEDURE — 78452 HT MUSCLE IMAGE SPECT MULT: CPT

## 2020-07-01 PROCEDURE — A9502: CPT

## 2020-07-01 PROCEDURE — 93017 CV STRESS TEST TRACING ONLY: CPT

## 2020-10-07 NOTE — H&P ADULT - NEGATIVE OPHTHALMOLOGIC SYMPTOMS
Patient has the following symptoms: black stool with abdominal cramps. Diarrhea/loose stool   The symptoms have been present since last night  Wants to be seen today.      no diplopia/no photophobia

## 2021-11-30 NOTE — ED ADULT TRIAGE NOTE - CADM TRG TX PRIOR TO ARRIVAL
adenosine 6 mg. iv/medications Spiral Flap Text: The defect edges were debeveled with a #15 scalpel blade.  Given the location of the defect, shape of the defect and the proximity to free margins a spiral flap was deemed most appropriate.  Using a sterile surgical marker, an appropriate rotation flap was drawn incorporating the defect and placing the expected incisions within the relaxed skin tension lines where possible. The area thus outlined was incised deep to adipose tissue with a #15 scalpel blade.  The skin margins were undermined to an appropriate distance in all directions utilizing iris scissors.

## 2024-02-01 NOTE — ED ADULT NURSE NOTE - RESPIRATORY WDL
Patient/Caregiver provided printed discharge information. Breathing spontaneous and unlabored. Breath sounds clear and equal bilaterally with regular rhythm.
